# Patient Record
Sex: MALE | Race: WHITE | NOT HISPANIC OR LATINO
[De-identification: names, ages, dates, MRNs, and addresses within clinical notes are randomized per-mention and may not be internally consistent; named-entity substitution may affect disease eponyms.]

---

## 2018-03-26 ENCOUNTER — NON-APPOINTMENT (OUTPATIENT)
Age: 73
End: 2018-03-26

## 2018-03-26 ENCOUNTER — APPOINTMENT (OUTPATIENT)
Dept: HEART AND VASCULAR | Facility: CLINIC | Age: 73
End: 2018-03-26
Payer: COMMERCIAL

## 2018-03-26 VITALS
BODY MASS INDEX: 25.46 KG/M2 | HEART RATE: 70 BPM | RESPIRATION RATE: 14 BRPM | SYSTOLIC BLOOD PRESSURE: 136 MMHG | DIASTOLIC BLOOD PRESSURE: 76 MMHG | WEIGHT: 168 LBS | HEIGHT: 68 IN

## 2018-03-26 DIAGNOSIS — K90.0 CELIAC DISEASE: ICD-10-CM

## 2018-03-26 PROCEDURE — 93000 ELECTROCARDIOGRAM COMPLETE: CPT

## 2018-03-26 PROCEDURE — 99214 OFFICE O/P EST MOD 30 MIN: CPT | Mod: 25

## 2018-03-26 PROCEDURE — 36415 COLL VENOUS BLD VENIPUNCTURE: CPT

## 2018-03-26 RX ORDER — CHLORHEXIDINE GLUCONATE 4 %
1000 LIQUID (ML) TOPICAL
Refills: 0 | Status: ACTIVE | COMMUNITY

## 2018-03-27 LAB
ANION GAP SERPL CALC-SCNC: 15 MMOL/L
BUN SERPL-MCNC: 20 MG/DL
CALCIUM SERPL-MCNC: 8.8 MG/DL
CHLORIDE SERPL-SCNC: 102 MMOL/L
CO2 SERPL-SCNC: 23 MMOL/L
CREAT SERPL-MCNC: 0.98 MG/DL
GLUCOSE SERPL-MCNC: 96 MG/DL
POTASSIUM SERPL-SCNC: 4.9 MMOL/L
SODIUM SERPL-SCNC: 140 MMOL/L

## 2018-04-02 ENCOUNTER — OTHER (OUTPATIENT)
Age: 73
End: 2018-04-02

## 2018-04-02 ENCOUNTER — RX RENEWAL (OUTPATIENT)
Age: 73
End: 2018-04-02

## 2018-04-13 ENCOUNTER — RESULT REVIEW (OUTPATIENT)
Age: 73
End: 2018-04-13

## 2018-05-21 ENCOUNTER — APPOINTMENT (OUTPATIENT)
Dept: HEART AND VASCULAR | Facility: CLINIC | Age: 73
End: 2018-05-21
Payer: COMMERCIAL

## 2018-05-21 VITALS
WEIGHT: 158 LBS | HEART RATE: 80 BPM | RESPIRATION RATE: 14 BRPM | SYSTOLIC BLOOD PRESSURE: 118 MMHG | BODY MASS INDEX: 23.95 KG/M2 | HEIGHT: 68 IN | DIASTOLIC BLOOD PRESSURE: 68 MMHG

## 2018-05-21 PROCEDURE — 99214 OFFICE O/P EST MOD 30 MIN: CPT

## 2018-05-21 RX ORDER — METOPROLOL SUCCINATE 50 MG/1
50 TABLET, EXTENDED RELEASE ORAL
Qty: 2 | Refills: 0 | Status: DISCONTINUED | COMMUNITY
Start: 2018-04-02 | End: 2018-05-21

## 2018-08-14 ENCOUNTER — LABORATORY RESULT (OUTPATIENT)
Age: 73
End: 2018-08-14

## 2018-08-14 ENCOUNTER — APPOINTMENT (OUTPATIENT)
Dept: HEART AND VASCULAR | Facility: CLINIC | Age: 73
End: 2018-08-14
Payer: COMMERCIAL

## 2018-08-14 PROCEDURE — 36415 COLL VENOUS BLD VENIPUNCTURE: CPT

## 2018-08-15 LAB
ALBUMIN SERPL ELPH-MCNC: 4 G/DL
ALP BLD-CCNC: 221 U/L
ALT SERPL-CCNC: 83 U/L
ANION GAP SERPL CALC-SCNC: 12 MMOL/L
AST SERPL-CCNC: 56 U/L
B BURGDOR AB SER-IMP: POSITIVE
B BURGDOR IGG+IGM SER QL IB: NORMAL
B BURGDOR IGG+IGM SER QL: 7.46 INDEX
BASOPHILS # BLD AUTO: 0.04 K/UL
BASOPHILS NFR BLD AUTO: 0.4 %
BILIRUB SERPL-MCNC: 0.3 MG/DL
BUN SERPL-MCNC: 15 MG/DL
CALCIUM SERPL-MCNC: 9.3 MG/DL
CHLORIDE SERPL-SCNC: 107 MMOL/L
CHOLEST SERPL-MCNC: 159 MG/DL
CHOLEST/HDLC SERPL: 7.2 RATIO
CO2 SERPL-SCNC: 22 MMOL/L
CREAT SERPL-MCNC: 0.95 MG/DL
EOSINOPHIL # BLD AUTO: 0.22 K/UL
EOSINOPHIL NFR BLD AUTO: 2.1 %
ERYTHROCYTE [SEDIMENTATION RATE] IN BLOOD BY WESTERGREN METHOD: 38 MM/HR
GLUCOSE SERPL-MCNC: 88 MG/DL
HCT VFR BLD CALC: 46 %
HDLC SERPL-MCNC: 22 MG/DL
HGB BLD-MCNC: 14.2 G/DL
IMM GRANULOCYTES NFR BLD AUTO: 0.2 %
LDLC SERPL CALC-MCNC: 105 MG/DL
LYMPHOCYTES # BLD AUTO: 1.43 K/UL
LYMPHOCYTES NFR BLD AUTO: 13.3 %
MAN DIFF?: NORMAL
MCHC RBC-ENTMCNC: 22.7 PG
MCHC RBC-ENTMCNC: 30.9 GM/DL
MCV RBC AUTO: 73.5 FL
MONOCYTES # BLD AUTO: 0.59 K/UL
MONOCYTES NFR BLD AUTO: 5.5 %
NEUTROPHILS # BLD AUTO: 8.42 K/UL
NEUTROPHILS NFR BLD AUTO: 78.5 %
PLATELET # BLD AUTO: 355 K/UL
POTASSIUM SERPL-SCNC: 5.1 MMOL/L
PROT SERPL-MCNC: 7.2 G/DL
RBC # BLD: 6.26 M/UL
RBC # FLD: 15.5 %
SODIUM SERPL-SCNC: 141 MMOL/L
TRIGL SERPL-MCNC: 158 MG/DL
WBC # FLD AUTO: 10.72 K/UL

## 2018-08-17 LAB
ANAPLASMA PHAGOCYTO IGM COMENT: NORMAL
ANAPLASMA PHAGOCYTO IGM COMMENT: NORMAL
ANAPLASMA PHAGOCYTOPHILIA IGG ANTIBODIES: NORMAL
ANAPLASMA PHAGOCYTOPHILIA IGM ANTIBODIES: NORMAL
B MICROTI AB SER QL: NORMAL
BABESIA ANTIBODIES, IGG: NORMAL
BABESIA ANTIBODIES, IGM: NORMAL
EHRLICIA CHAFFEENIS IGG ANTIBODIES: NORMAL
EHRLICIA CHAFFEENIS IGG COMMENT: NORMAL
EHRLICIA CHAFFEENIS IGG INTERP: NORMAL
EHRLICIA CHAFFEENIS IGM ANTIBODIES: NORMAL

## 2018-08-21 ENCOUNTER — RESULT REVIEW (OUTPATIENT)
Age: 73
End: 2018-08-21

## 2018-08-22 ENCOUNTER — RESULT REVIEW (OUTPATIENT)
Age: 73
End: 2018-08-22

## 2018-08-22 LAB
CK BB SERPL ELPH-CCNC: 0 % (ref 0–?)
CK MB CFR SERPL ELPH: 0 %
CK MM SERPL ELPH-CCNC: 100 %
CREATINE KINASE,TOTAL,SERUM: 142 U/L
MACRO TYPE 1: 0 %
MACRO TYPE 2: 0 %

## 2019-03-11 ENCOUNTER — APPOINTMENT (OUTPATIENT)
Dept: HEART AND VASCULAR | Facility: CLINIC | Age: 74
End: 2019-03-11
Payer: COMMERCIAL

## 2019-03-11 VITALS
RESPIRATION RATE: 12 BRPM | DIASTOLIC BLOOD PRESSURE: 66 MMHG | BODY MASS INDEX: 23.34 KG/M2 | SYSTOLIC BLOOD PRESSURE: 114 MMHG | HEART RATE: 58 BPM | HEIGHT: 68 IN | WEIGHT: 154 LBS

## 2019-03-11 DIAGNOSIS — Z86.19 PERSONAL HISTORY OF OTHER INFECTIOUS AND PARASITIC DISEASES: ICD-10-CM

## 2019-03-11 DIAGNOSIS — Z86.14 PERSONAL HISTORY OF METHICILLIN RESISTANT STAPHYLOCOCCUS AUREUS INFECTION: ICD-10-CM

## 2019-03-11 PROCEDURE — 99214 OFFICE O/P EST MOD 30 MIN: CPT

## 2019-03-11 RX ORDER — FOLIC ACID 1 MG/1
1 TABLET ORAL
Refills: 0 | Status: ACTIVE | COMMUNITY

## 2019-03-11 NOTE — REVIEW OF SYSTEMS
[Earache] : no earache [Discharge From The Ears] : no discharge from the ears [Loss Of Hearing] : hearing loss [Mouth Sores] : no mouth sores [Sore Throat] : no sore throat [Sinus Pressure] : no sinus pressure [Negative] : Heme/Lymph

## 2019-03-11 NOTE — DISCUSSION/SUMMARY
[Cardiomyopathy] : cardiomyopathy [Coronary Artery Disease] : coronary artery disease [Possible Cardiac Ischemia (Intermd Prob)] : possible cardiac ischemia (intermediate probability) [Non-Cardiac] : non-cardiac symptoms [Dietary Modification] : dietary modification [Hyperlipidemia] : hyperlipidemia [Diet Modification] : diet modification [Exercise] : exercise [Hypertension] : hypertension [Exercise Regimen] : an exercise regimen [Sodium Restriction] : sodium restriction [Peripheral Vascular Disease] : peripheral vascular disease [Stable] : stable [None] : none [Exercise Rehab] : exercise rehabilitation [Patient] : the patient [de-identified] : TAA 4.6 cm

## 2019-03-11 NOTE — HISTORY OF PRESENT ILLNESS
[FreeTextEntry1] : James Francois returns for follow up.  He denies cp, sob, pnd, orthopnea, edema, palp, or loc.\par \par He is active and compliant with meds.\par \par He has been treated for MRSA and Lyme.

## 2019-03-11 NOTE — PHYSICAL EXAM
[General Appearance - Well Developed] : well developed [Normal Appearance] : normal appearance [Well Groomed] : well groomed [General Appearance - Well Nourished] : well nourished [No Deformities] : no deformities [General Appearance - In No Acute Distress] : no acute distress [Normal Conjunctiva] : the conjunctiva exhibited no abnormalities [Eyelids - No Xanthelasma] : the eyelids demonstrated no xanthelasmas [Normal Oral Mucosa] : normal oral mucosa [No Oral Pallor] : no oral pallor [No Oral Cyanosis] : no oral cyanosis [Normal Jugular Venous A Waves Present] : normal jugular venous A waves present [Normal Jugular Venous V Waves Present] : normal jugular venous V waves present [No Jugular Venous Doran A Waves] : no jugular venous doran A waves [Respiration, Rhythm And Depth] : normal respiratory rhythm and effort [Exaggerated Use Of Accessory Muscles For Inspiration] : no accessory muscle use [Auscultation Breath Sounds / Voice Sounds] : lungs were clear to auscultation bilaterally [Heart Rate And Rhythm] : heart rate and rhythm were normal [Heart Sounds] : normal S1 and S2 [Abdomen Soft] : soft [Abdomen Tenderness] : non-tender [Abdomen Mass (___ Cm)] : no abdominal mass palpated [Abnormal Walk] : normal gait [Gait - Sufficient For Exercise Testing] : the gait was sufficient for exercise testing [Nail Clubbing] : no clubbing of the fingernails [Cyanosis, Localized] : no localized cyanosis [Petechial Hemorrhages (___cm)] : no petechial hemorrhages [FreeTextEntry1] : L arm in sling [Skin Color & Pigmentation] : normal skin color and pigmentation [] : no rash [No Venous Stasis] : no venous stasis [Skin Lesions] : no skin lesions [No Skin Ulcers] : no skin ulcer [No Xanthoma] : no  xanthoma was observed

## 2019-06-11 ENCOUNTER — OTHER (OUTPATIENT)
Age: 74
End: 2019-06-11

## 2019-06-11 ENCOUNTER — APPOINTMENT (OUTPATIENT)
Dept: HEART AND VASCULAR | Facility: CLINIC | Age: 74
End: 2019-06-11
Payer: COMMERCIAL

## 2019-06-11 PROCEDURE — 36415 COLL VENOUS BLD VENIPUNCTURE: CPT

## 2019-06-11 NOTE — PHYSICAL EXAM
[General Appearance - Well Developed] : well developed [Normal Appearance] : normal appearance [Well Groomed] : well groomed [General Appearance - Well Nourished] : well nourished [No Deformities] : no deformities [Normal Conjunctiva] : the conjunctiva exhibited no abnormalities [General Appearance - In No Acute Distress] : no acute distress [Eyelids - No Xanthelasma] : the eyelids demonstrated no xanthelasmas [Normal Oral Mucosa] : normal oral mucosa [No Oral Pallor] : no oral pallor [No Oral Cyanosis] : no oral cyanosis [Normal Jugular Venous A Waves Present] : normal jugular venous A waves present [No Jugular Venous Doran A Waves] : no jugular venous doran A waves [Normal Jugular Venous V Waves Present] : normal jugular venous V waves present [Respiration, Rhythm And Depth] : normal respiratory rhythm and effort [Exaggerated Use Of Accessory Muscles For Inspiration] : no accessory muscle use [Auscultation Breath Sounds / Voice Sounds] : lungs were clear to auscultation bilaterally [Heart Rate And Rhythm] : heart rate and rhythm were normal [Heart Sounds] : normal S1 and S2 [Abdomen Soft] : soft [Abdomen Tenderness] : non-tender [Abdomen Mass (___ Cm)] : no abdominal mass palpated [Abnormal Walk] : normal gait [Gait - Sufficient For Exercise Testing] : the gait was sufficient for exercise testing [Nail Clubbing] : no clubbing of the fingernails [Petechial Hemorrhages (___cm)] : no petechial hemorrhages [Cyanosis, Localized] : no localized cyanosis [FreeTextEntry1] : L arm in sling [] : no rash [Skin Color & Pigmentation] : normal skin color and pigmentation [No Venous Stasis] : no venous stasis [Skin Lesions] : no skin lesions [No Skin Ulcers] : no skin ulcer [No Xanthoma] : no  xanthoma was observed

## 2019-06-11 NOTE — HISTORY OF PRESENT ILLNESS
[FreeTextEntry1] : James STUBBS [facility] returns for follow up.  \par \par He is active and compliant with meds.\par \par He has been treated for MRSA and Lyme.

## 2019-06-11 NOTE — DISCUSSION/SUMMARY
[Cardiomyopathy] : cardiomyopathy [Coronary Artery Disease] : coronary artery disease [Possible Cardiac Ischemia (Intermd Prob)] : possible cardiac ischemia (intermediate probability) [Non-Cardiac] : non-cardiac symptoms [Dietary Modification] : dietary modification [Hyperlipidemia] : hyperlipidemia [Exercise] : exercise [Diet Modification] : diet modification [Hypertension] : hypertension [Exercise Regimen] : an exercise regimen [Sodium Restriction] : sodium restriction [Peripheral Vascular Disease] : peripheral vascular disease [None] : none [Stable] : stable [Exercise Rehab] : exercise rehabilitation [Patient] : the patient [de-identified] : TAA 4.6 cm

## 2019-06-12 LAB
ANION GAP SERPL CALC-SCNC: 10 MMOL/L
BUN SERPL-MCNC: 18 MG/DL
CALCIUM SERPL-MCNC: 8.7 MG/DL
CHLORIDE SERPL-SCNC: 108 MMOL/L
CO2 SERPL-SCNC: 26 MMOL/L
CREAT SERPL-MCNC: 0.98 MG/DL
GLUCOSE SERPL-MCNC: 98 MG/DL
POTASSIUM SERPL-SCNC: 5.1 MMOL/L
SODIUM SERPL-SCNC: 144 MMOL/L

## 2019-06-17 ENCOUNTER — APPOINTMENT (OUTPATIENT)
Dept: HEART AND VASCULAR | Facility: CLINIC | Age: 74
End: 2019-06-17
Payer: COMMERCIAL

## 2019-06-17 PROCEDURE — 93351 STRESS TTE COMPLETE: CPT

## 2019-06-17 PROCEDURE — 93320 DOPPLER ECHO COMPLETE: CPT

## 2019-06-17 PROCEDURE — 93325 DOPPLER ECHO COLOR FLOW MAPG: CPT

## 2019-06-25 ENCOUNTER — RESULT REVIEW (OUTPATIENT)
Age: 74
End: 2019-06-25

## 2019-10-24 ENCOUNTER — NON-APPOINTMENT (OUTPATIENT)
Age: 74
End: 2019-10-24

## 2019-10-24 ENCOUNTER — APPOINTMENT (OUTPATIENT)
Dept: HEART AND VASCULAR | Facility: CLINIC | Age: 74
End: 2019-10-24
Payer: COMMERCIAL

## 2019-10-24 VITALS
WEIGHT: 156 LBS | HEIGHT: 68 IN | BODY MASS INDEX: 23.64 KG/M2 | DIASTOLIC BLOOD PRESSURE: 76 MMHG | RESPIRATION RATE: 16 BRPM | SYSTOLIC BLOOD PRESSURE: 132 MMHG | HEART RATE: 48 BPM

## 2019-10-24 DIAGNOSIS — I08.0 RHEUMATIC DISORDERS OF BOTH MITRAL AND AORTIC VALVES: ICD-10-CM

## 2019-10-24 PROCEDURE — 93000 ELECTROCARDIOGRAM COMPLETE: CPT

## 2019-10-24 PROCEDURE — 99214 OFFICE O/P EST MOD 30 MIN: CPT | Mod: 25

## 2019-10-24 NOTE — REASON FOR VISIT
[Follow-Up - Clinic] : a clinic follow-up of [Abnormal ECG] : an abnormal ECG [Cardiomyopathy] : cardiomyopathy [Coronary Artery Disease] : coronary artery disease [Hyperlipidemia] : hyperlipidemia [Hypertension] : hypertension [Peripheral Vascular Disease] : peripheral vascular disease

## 2019-10-24 NOTE — REVIEW OF SYSTEMS
[Fever] : no fever [Headache] : no headache [Chills] : no chills [Feeling Fatigued] : feeling fatigued [Earache] : no earache [Discharge From The Ears] : no discharge from the ears [Loss Of Hearing] : hearing loss [Mouth Sores] : no mouth sores [Sore Throat] : no sore throat [Sinus Pressure] : no sinus pressure [Negative] : Heme/Lymph

## 2019-10-24 NOTE — DISCUSSION/SUMMARY
[Bundle Branch Block] : ~T bundle branch block [Cardiomyopathy] : cardiomyopathy [Coronary Artery Disease] : coronary artery disease [Possible Cardiac Ischemia (Intermd Prob)] : possible cardiac ischemia (intermediate probability) [Non-Cardiac] : non-cardiac symptoms [Dietary Modification] : dietary modification [Hyperlipidemia] : hyperlipidemia [Diet Modification] : diet modification [Exercise] : exercise [Hypertension] : hypertension [Exercise Regimen] : an exercise regimen [Sodium Restriction] : sodium restriction [Peripheral Vascular Disease] : peripheral vascular disease [Stable] : stable [None] : none [Exercise Rehab] : exercise rehabilitation [Patient] : the patient [de-identified] : TAA 4.6 cm

## 2019-10-24 NOTE — HISTORY OF PRESENT ILLNESS
[FreeTextEntry1] : James Francois returns for follow up.  He was evaluated at [facility] for flank pain twice in early October 2019.  He was noted to have R sided hydronephrosis by renal ultrasound.  He was treated with pain meds and hydration.  He passed a stone a few days later.  \par \par He denies cp, sob, pnd, orthopnea, edema, palp, or loc.\par \par It was noted that he had ectopy and bradycardia.  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  Consider EP eligio.\par

## 2019-11-13 ENCOUNTER — RX CHANGE (OUTPATIENT)
Age: 74
End: 2019-11-13

## 2019-12-06 ENCOUNTER — APPOINTMENT (OUTPATIENT)
Dept: HEART AND VASCULAR | Facility: CLINIC | Age: 74
End: 2019-12-06
Payer: COMMERCIAL

## 2019-12-06 ENCOUNTER — NON-APPOINTMENT (OUTPATIENT)
Age: 74
End: 2019-12-06

## 2019-12-06 VITALS
HEART RATE: 56 BPM | DIASTOLIC BLOOD PRESSURE: 85 MMHG | RESPIRATION RATE: 16 BRPM | BODY MASS INDEX: 23.95 KG/M2 | SYSTOLIC BLOOD PRESSURE: 142 MMHG | WEIGHT: 158 LBS | HEIGHT: 68 IN

## 2019-12-06 PROCEDURE — 99205 OFFICE O/P NEW HI 60 MIN: CPT

## 2019-12-06 PROCEDURE — 93000 ELECTROCARDIOGRAM COMPLETE: CPT

## 2019-12-06 NOTE — REASON FOR VISIT
[Initial Evaluation] : an initial evaluation of [Abnormal ECG] : an abnormal ECG [FreeTextEntry1] : James Francois is a 74 y.o. M with pmhx significant for IVCD (RBBB+LAFB), nephrolithiasis, thoracic aortic aneurysm, and CAD s/p stent who presents for an initial evaluation.\par \par The patient had a recent hospitalization for nephrolithiasis where they incidentally found EKG abnormalities. The patient was given a holter monitor which showed ventricular bigeminy, pSVT, NSVT (few beats). No pauses or significant bradycardia. The patient denies syncope, lightheadedness, sob, [place], c/p and LE edema.   \par \par \par \par

## 2019-12-06 NOTE — PHYSICAL EXAM
[General Appearance - Well Developed] : well developed [Normal Appearance] : normal appearance [Well Groomed] : well groomed [General Appearance - Well Nourished] : well nourished [No Deformities] : no deformities [General Appearance - In No Acute Distress] : no acute distress [Normal Conjunctiva] : the conjunctiva exhibited no abnormalities [Eyelids - No Xanthelasma] : the eyelids demonstrated no xanthelasmas [No Oral Pallor] : no oral pallor [Normal Oral Mucosa] : normal oral mucosa [No Oral Cyanosis] : no oral cyanosis [Normal Jugular Venous A Waves Present] : normal jugular venous A waves present [Normal Jugular Venous V Waves Present] : normal jugular venous V waves present [No Jugular Venous Doran A Waves] : no jugular venous doran A waves [Heart Rate And Rhythm] : heart rate and rhythm were normal [Heart Sounds] : normal S1 and S2 [Bradycardic ___] : the heart rate was bradycardic at [unfilled] bpm [Regular] : the rhythm was regular [Respiration, Rhythm And Depth] : normal respiratory rhythm and effort [Exaggerated Use Of Accessory Muscles For Inspiration] : no accessory muscle use [Auscultation Breath Sounds / Voice Sounds] : lungs were clear to auscultation bilaterally [Abdomen Soft] : soft [Abdomen Tenderness] : non-tender [Abdomen Mass (___ Cm)] : no abdominal mass palpated [Abnormal Walk] : normal gait [Gait - Sufficient For Exercise Testing] : the gait was sufficient for exercise testing [Nail Clubbing] : no clubbing of the fingernails [Cyanosis, Localized] : no localized cyanosis [Petechial Hemorrhages (___cm)] : no petechial hemorrhages [Skin Color & Pigmentation] : normal skin color and pigmentation [] : no rash [No Venous Stasis] : no venous stasis [No Skin Ulcers] : no skin ulcer [Skin Lesions] : no skin lesions [No Xanthoma] : no  xanthoma was observed [Oriented To Time, Place, And Person] : oriented to person, place, and time [Affect] : the affect was normal [Mood] : the mood was normal [No Anxiety] : not feeling anxious

## 2020-08-13 ENCOUNTER — APPOINTMENT (OUTPATIENT)
Dept: HEART AND VASCULAR | Facility: CLINIC | Age: 75
End: 2020-08-13
Payer: COMMERCIAL

## 2020-08-13 ENCOUNTER — NON-APPOINTMENT (OUTPATIENT)
Age: 75
End: 2020-08-13

## 2020-08-13 VITALS
HEART RATE: 68 BPM | BODY MASS INDEX: 24.55 KG/M2 | TEMPERATURE: 97.8 F | RESPIRATION RATE: 16 BRPM | SYSTOLIC BLOOD PRESSURE: 125 MMHG | DIASTOLIC BLOOD PRESSURE: 84 MMHG | WEIGHT: 162 LBS | OXYGEN SATURATION: 97 % | HEIGHT: 68 IN

## 2020-08-13 PROCEDURE — 99215 OFFICE O/P EST HI 40 MIN: CPT

## 2020-08-13 PROCEDURE — 93000 ELECTROCARDIOGRAM COMPLETE: CPT

## 2020-08-13 NOTE — HISTORY OF PRESENT ILLNESS
[FreeTextEntry1] : James Francois returns for follow up.  \par \par He developed chest pressure, diaphoresis, nausea, and lightheadedness while getting dental work yesterday.  He was unsteady for some time.  He went home and rested for the remainder of the day.  \par \par Today, he denies cp, sob, pnd, orthopnea, edema, palp, or loc.\par \par He is active (plays golf frequently) and remains compliant with meds.\par \par ECG today reveals NSR, RBBB, LAFB.\par \par Reviewed clinical hx in detail.\par \par Recommendations:\par 1. blood work\par 2. EXSE\par 3. CTA chest/coronary\par

## 2020-08-13 NOTE — PHYSICAL EXAM
[Well Groomed] : well groomed [General Appearance - Well Developed] : well developed [Normal Appearance] : normal appearance [No Deformities] : no deformities [General Appearance - In No Acute Distress] : no acute distress [General Appearance - Well Nourished] : well nourished [Eyelids - No Xanthelasma] : the eyelids demonstrated no xanthelasmas [Normal Conjunctiva] : the conjunctiva exhibited no abnormalities [Normal Oral Mucosa] : normal oral mucosa [No Oral Pallor] : no oral pallor [Normal Jugular Venous A Waves Present] : normal jugular venous A waves present [No Oral Cyanosis] : no oral cyanosis [No Jugular Venous Doran A Waves] : no jugular venous doran A waves [Normal Jugular Venous V Waves Present] : normal jugular venous V waves present [Exaggerated Use Of Accessory Muscles For Inspiration] : no accessory muscle use [Respiration, Rhythm And Depth] : normal respiratory rhythm and effort [Auscultation Breath Sounds / Voice Sounds] : lungs were clear to auscultation bilaterally [Heart Rate And Rhythm] : heart rate and rhythm were normal [Heart Sounds] : normal S1 and S2 [Abdomen Tenderness] : non-tender [Abdomen Soft] : soft [Abnormal Walk] : normal gait [Abdomen Mass (___ Cm)] : no abdominal mass palpated [Nail Clubbing] : no clubbing of the fingernails [Gait - Sufficient For Exercise Testing] : the gait was sufficient for exercise testing [Cyanosis, Localized] : no localized cyanosis [Petechial Hemorrhages (___cm)] : no petechial hemorrhages [] : no rash [Skin Color & Pigmentation] : normal skin color and pigmentation [FreeTextEntry1] : L arm in sling [Skin Lesions] : no skin lesions [No Skin Ulcers] : no skin ulcer [No Venous Stasis] : no venous stasis [No Xanthoma] : no  xanthoma was observed

## 2020-08-13 NOTE — REVIEW OF SYSTEMS
[Fever] : no fever [Headache] : no headache [Chills] : no chills [Feeling Fatigued] : not feeling fatigued [Earache] : no earache [Discharge From The Ears] : no discharge from the ears [Loss Of Hearing] : hearing loss [Mouth Sores] : no mouth sores [Sore Throat] : no sore throat [Sinus Pressure] : no sinus pressure [Negative] : Heme/Lymph

## 2020-08-13 NOTE — DISCUSSION/SUMMARY
[Bundle Branch Block] : ~T bundle branch block [Cardiomyopathy] : cardiomyopathy [Possible Cardiac Ischemia (Intermd Prob)] : possible cardiac ischemia (intermediate probability) [Anginal Equivalent] : anginal equivalent [Non-Cardiac] : non-cardiac symptoms [Deteriorating] : deteriorating [Dietary Modification] : dietary modification [Hyperlipidemia] : hyperlipidemia [Diet Modification] : diet modification [Exercise] : exercise [Hypertension] : hypertension [Exercise Regimen] : an exercise regimen [Sodium Restriction] : sodium restriction [Stable] : stable [Peripheral Vascular Disease] : peripheral vascular disease [None] : none [Exercise Rehab] : exercise rehabilitation [de-identified] : TAA 4.6 cm [Patient] : the patient

## 2020-08-14 LAB
ALBUMIN SERPL ELPH-MCNC: 4.4 G/DL
ALP BLD-CCNC: 106 U/L
ALT SERPL-CCNC: 35 U/L
ANION GAP SERPL CALC-SCNC: 12 MMOL/L
AST SERPL-CCNC: 30 U/L
BASOPHILS # BLD AUTO: 0.03 K/UL
BASOPHILS NFR BLD AUTO: 0.4 %
BILIRUB SERPL-MCNC: 0.5 MG/DL
BUN SERPL-MCNC: 23 MG/DL
CALCIUM SERPL-MCNC: 8.9 MG/DL
CHLORIDE SERPL-SCNC: 107 MMOL/L
CHOLEST SERPL-MCNC: 194 MG/DL
CHOLEST/HDLC SERPL: 4.2 RATIO
CK SERPL-CCNC: 178 U/L
CO2 SERPL-SCNC: 23 MMOL/L
CREAT SERPL-MCNC: 1.05 MG/DL
EOSINOPHIL # BLD AUTO: 0.36 K/UL
EOSINOPHIL NFR BLD AUTO: 4.5 %
GLUCOSE SERPL-MCNC: 107 MG/DL
HCT VFR BLD CALC: 48.1 %
HDLC SERPL-MCNC: 46 MG/DL
HGB BLD-MCNC: 14.6 G/DL
IMM GRANULOCYTES NFR BLD AUTO: 0.3 %
LDLC SERPL CALC-MCNC: 119 MG/DL
LYMPHOCYTES # BLD AUTO: 1.74 K/UL
LYMPHOCYTES NFR BLD AUTO: 21.8 %
MAN DIFF?: NORMAL
MCHC RBC-ENTMCNC: 24.3 PG
MCHC RBC-ENTMCNC: 30.4 GM/DL
MCV RBC AUTO: 79.9 FL
MONOCYTES # BLD AUTO: 0.47 K/UL
MONOCYTES NFR BLD AUTO: 5.9 %
NEUTROPHILS # BLD AUTO: 5.38 K/UL
NEUTROPHILS NFR BLD AUTO: 67.1 %
PLATELET # BLD AUTO: 246 K/UL
POTASSIUM SERPL-SCNC: 4.6 MMOL/L
PROT SERPL-MCNC: 6.5 G/DL
RBC # BLD: 6.02 M/UL
RBC # FLD: 16.2 %
SODIUM SERPL-SCNC: 142 MMOL/L
TRIGL SERPL-MCNC: 146 MG/DL
TROPONIN I SERPL-MCNC: 0.01 NG/ML
WBC # FLD AUTO: 8 K/UL

## 2020-08-17 ENCOUNTER — APPOINTMENT (OUTPATIENT)
Dept: HEART AND VASCULAR | Facility: CLINIC | Age: 75
End: 2020-08-17
Payer: COMMERCIAL

## 2020-08-17 VITALS
BODY MASS INDEX: 23.49 KG/M2 | TEMPERATURE: 98.2 F | DIASTOLIC BLOOD PRESSURE: 76 MMHG | HEIGHT: 68 IN | WEIGHT: 155 LBS | HEART RATE: 53 BPM | SYSTOLIC BLOOD PRESSURE: 108 MMHG | OXYGEN SATURATION: 96 %

## 2020-08-17 PROCEDURE — 93351 STRESS TTE COMPLETE: CPT

## 2020-08-17 PROCEDURE — 93320 DOPPLER ECHO COMPLETE: CPT

## 2020-08-17 PROCEDURE — 93325 DOPPLER ECHO COLOR FLOW MAPG: CPT

## 2020-08-20 ENCOUNTER — APPOINTMENT (OUTPATIENT)
Dept: HEART AND VASCULAR | Facility: CLINIC | Age: 75
End: 2020-08-20
Payer: COMMERCIAL

## 2020-08-20 VITALS
SYSTOLIC BLOOD PRESSURE: 124 MMHG | TEMPERATURE: 98.5 F | HEART RATE: 54 BPM | OXYGEN SATURATION: 97 % | DIASTOLIC BLOOD PRESSURE: 64 MMHG | HEIGHT: 68 IN | WEIGHT: 155 LBS | RESPIRATION RATE: 14 BRPM | BODY MASS INDEX: 23.49 KG/M2

## 2020-08-20 PROCEDURE — 99215 OFFICE O/P EST HI 40 MIN: CPT

## 2020-08-20 NOTE — REVIEW OF SYSTEMS
[Fever] : no fever [Headache] : no headache [Earache] : no earache [Chills] : no chills [Feeling Fatigued] : not feeling fatigued [Discharge From The Ears] : no discharge from the ears [Sore Throat] : no sore throat [Loss Of Hearing] : hearing loss [Mouth Sores] : no mouth sores [Sinus Pressure] : no sinus pressure [Negative] : Heme/Lymph

## 2020-08-20 NOTE — REASON FOR VISIT
[Follow-Up - Clinic] : a clinic follow-up of [Abnormal ECG] : an abnormal ECG [Cardiomyopathy] : cardiomyopathy [Coronary Artery Disease] : coronary artery disease [Hypertension] : hypertension [Hyperlipidemia] : hyperlipidemia [FreeTextEntry1] : AI/TR [Peripheral Vascular Disease] : peripheral vascular disease

## 2020-08-20 NOTE — HISTORY OF PRESENT ILLNESS
[FreeTextEntry1] : James STUBBS [facility] returns for follow up.  \par Clinical hx from 8/13/2020 visit:\par He developed chest pressure, diaphoresis, nausea, and lightheadedness while getting dental work yesterday.  He was unsteady for some time.  He went home and rested for the remainder of the day.  \par \par Today, he denies cp, sob, pnd, orthopnea, edema, palp, or loc.\par \par He is active (plays golf frequently) and remains compliant with meds.\par \par EXSE 8/2020: nl lv sys fxn; indeterminant caba fxn; Ao root (4.6 cm); mild to mod AI; mild TR; 11:15 min Blayne; no ischemia; high grade ventricular ectopy in recovery\par \par Reviewed clinical hx and results in detail.\par \par Recommendations:\par 1. CTA chest/coronary\par 2. consider ZIO\par 3. collect records for patient\par

## 2020-08-20 NOTE — PHYSICAL EXAM
[General Appearance - Well Developed] : well developed [Well Groomed] : well groomed [Normal Appearance] : normal appearance [General Appearance - In No Acute Distress] : no acute distress [General Appearance - Well Nourished] : well nourished [No Deformities] : no deformities [Normal Conjunctiva] : the conjunctiva exhibited no abnormalities [Normal Oral Mucosa] : normal oral mucosa [Eyelids - No Xanthelasma] : the eyelids demonstrated no xanthelasmas [No Oral Pallor] : no oral pallor [No Oral Cyanosis] : no oral cyanosis [Normal Jugular Venous A Waves Present] : normal jugular venous A waves present [Normal Jugular Venous V Waves Present] : normal jugular venous V waves present [No Jugular Venous Doran A Waves] : no jugular venous doran A waves [Exaggerated Use Of Accessory Muscles For Inspiration] : no accessory muscle use [Auscultation Breath Sounds / Voice Sounds] : lungs were clear to auscultation bilaterally [Respiration, Rhythm And Depth] : normal respiratory rhythm and effort [Heart Rate And Rhythm] : heart rate and rhythm were normal [Heart Sounds] : normal S1 and S2 [Abdomen Mass (___ Cm)] : no abdominal mass palpated [Abdomen Soft] : soft [Abdomen Tenderness] : non-tender [Gait - Sufficient For Exercise Testing] : the gait was sufficient for exercise testing [Abnormal Walk] : normal gait [Nail Clubbing] : no clubbing of the fingernails [Petechial Hemorrhages (___cm)] : no petechial hemorrhages [Cyanosis, Localized] : no localized cyanosis [No Venous Stasis] : no venous stasis [FreeTextEntry1] : L arm in sling [] : no rash [Skin Color & Pigmentation] : normal skin color and pigmentation [No Skin Ulcers] : no skin ulcer [Skin Lesions] : no skin lesions [No Xanthoma] : no  xanthoma was observed

## 2020-08-22 ENCOUNTER — NON-APPOINTMENT (OUTPATIENT)
Age: 75
End: 2020-08-22

## 2020-09-09 ENCOUNTER — RESULT REVIEW (OUTPATIENT)
Age: 75
End: 2020-09-09

## 2020-09-14 RX ORDER — METOPROLOL SUCCINATE 25 MG/1
25 TABLET, EXTENDED RELEASE ORAL
Qty: 2 | Refills: 0 | Status: DISCONTINUED | COMMUNITY
Start: 2020-08-21 | End: 2020-09-14

## 2020-09-23 ENCOUNTER — APPOINTMENT (OUTPATIENT)
Dept: HEART AND VASCULAR | Facility: CLINIC | Age: 75
End: 2020-09-23
Payer: COMMERCIAL

## 2020-09-23 PROCEDURE — 36415 COLL VENOUS BLD VENIPUNCTURE: CPT

## 2020-09-24 LAB
ALBUMIN SERPL ELPH-MCNC: 4.2 G/DL
ALP BLD-CCNC: 99 U/L
ALT SERPL-CCNC: 39 U/L
ANION GAP SERPL CALC-SCNC: 12 MMOL/L
APPEARANCE: CLEAR
AST SERPL-CCNC: 43 U/L
BACTERIA: NEGATIVE
BASOPHILS # BLD AUTO: 0.02 K/UL
BASOPHILS NFR BLD AUTO: 0.3 %
BILIRUB SERPL-MCNC: 0.5 MG/DL
BILIRUBIN URINE: NEGATIVE
BLOOD URINE: NEGATIVE
BUN SERPL-MCNC: 17 MG/DL
CALCIUM SERPL-MCNC: 8.7 MG/DL
CHLORIDE SERPL-SCNC: 107 MMOL/L
CHOLEST SERPL-MCNC: 178 MG/DL
CHOLEST/HDLC SERPL: 4.4 RATIO
CK SERPL-CCNC: 178 U/L
CO2 SERPL-SCNC: 24 MMOL/L
COLOR: YELLOW
CREAT SERPL-MCNC: 0.99 MG/DL
EOSINOPHIL # BLD AUTO: 0.16 K/UL
EOSINOPHIL NFR BLD AUTO: 2.1 %
GLUCOSE QUALITATIVE U: NEGATIVE
GLUCOSE SERPL-MCNC: 98 MG/DL
HCT VFR BLD CALC: 47.1 %
HDLC SERPL-MCNC: 40 MG/DL
HGB BLD-MCNC: 13.9 G/DL
HYALINE CASTS: 0 /LPF
IMM GRANULOCYTES NFR BLD AUTO: 0.3 %
INR PPP: 0.99 RATIO
KETONES URINE: NEGATIVE
LDLC SERPL CALC-MCNC: 114 MG/DL
LEUKOCYTE ESTERASE URINE: NEGATIVE
LYMPHOCYTES # BLD AUTO: 1.68 K/UL
LYMPHOCYTES NFR BLD AUTO: 22.1 %
MAN DIFF?: NORMAL
MCHC RBC-ENTMCNC: 23.4 PG
MCHC RBC-ENTMCNC: 29.5 GM/DL
MCV RBC AUTO: 79.3 FL
MICROSCOPIC-UA: NORMAL
MONOCYTES # BLD AUTO: 0.42 K/UL
MONOCYTES NFR BLD AUTO: 5.5 %
NEUTROPHILS # BLD AUTO: 5.29 K/UL
NEUTROPHILS NFR BLD AUTO: 69.7 %
NITRITE URINE: NEGATIVE
PH URINE: 5.5
PLATELET # BLD AUTO: 249 K/UL
POTASSIUM SERPL-SCNC: 4.5 MMOL/L
PROT SERPL-MCNC: 6.4 G/DL
PROTEIN URINE: NORMAL
PT BLD: 11.8 SEC
RBC # BLD: 5.94 M/UL
RBC # FLD: 15.5 %
RED BLOOD CELLS URINE: 4 /HPF
SODIUM SERPL-SCNC: 144 MMOL/L
SPECIFIC GRAVITY URINE: 1.03
SQUAMOUS EPITHELIAL CELLS: 1 /HPF
TRIGL SERPL-MCNC: 123 MG/DL
UROBILINOGEN URINE: ABNORMAL
WBC # FLD AUTO: 7.59 K/UL
WHITE BLOOD CELLS URINE: 1 /HPF

## 2020-09-28 VITALS
WEIGHT: 160.06 LBS | TEMPERATURE: 99 F | HEART RATE: 53 BPM | RESPIRATION RATE: 16 BRPM | OXYGEN SATURATION: 96 % | SYSTOLIC BLOOD PRESSURE: 148 MMHG | DIASTOLIC BLOOD PRESSURE: 91 MMHG | HEIGHT: 69 IN

## 2020-09-28 RX ORDER — CHLORHEXIDINE GLUCONATE 213 G/1000ML
1 SOLUTION TOPICAL ONCE
Refills: 0 | Status: DISCONTINUED | OUTPATIENT
Start: 2020-10-01 | End: 2020-10-01

## 2020-09-28 NOTE — H&P ADULT - HEIGHT IN INCHES
"                  MRN:[id number]                      After Visit Summary   4/19/2017    Jean Paul Siegel    MRN: [id number]           Thank you!     Thank you for choosing [facility] for your care. Our goal is always to provide you with excellent care. Hearing back from our patients is one way we can continue to improve our services. Please take a few minutes to complete the written survey that you may receive in the mail after you visit with us. Thank you!        Patient Information     Date Of Birth          1984        About your hospital stay     You were admitted on:  April 19, 2017 You last received care in the:  [facility], Endoscopy    You were discharged on:  April 19, 2017       Who to Call     For medical emergencies, please call 911.  For non-urgent questions about your medical care, please call your primary care provider or clinic, [phone]  For questions related to your surgery, please call your surgery clinic        Attending Provider     Provider Specialty    Jacob Allen MD Gastroenterology       Primary Care Provider Office Phone # Fax #    Trent KATIE Soria [phone]       [address] [address]        Pending Results     No orders found from 4/17/2017 to 4/20/2017.            Admission Information     Date & Time Provider Department Dept. Phone    4/19/2017 Jacob Allen MD [facility], [facility], Endoscopy [phone]      Your Vitals Were     Blood Pressure Respirations Height Weight Pulse Oximetry BMI (Body Mass Index)    138/89 14 1.829 m (6') 77.1 kg (170 lb) 99% 23.06 kg/m2      [employer] Information     [employer] lets you send messages to your doctor, view your test results, renew your prescriptions, schedule appointments and more. To sign up, go to [email or web address] . Click on \"Log in\" on the left side of the screen, which will take you to the Welcome page. Then click on \"Sign up Now\" on the right side of " the page.     You will be asked to enter the access code listed below, as well as some personal information. Please follow the directions to create your username and password.     Your access code is: [email or web address]  Expires: 2017  6:30 AM     Your access code will  in 90 days. If you need help or a new code, please call your [facility] clinic or [phone].        Care EveryWhere ID     This is your Care EveryWhere ID. This could be used by other organizations to access your [facility] medical records  [id number]           Review of your medicines      UNREVIEWED medicines. Ask your doctor about these medicines        Dose / Directions    * adalimumab 40 MG/0.8ML pen kit   Commonly known as:  HUMIRA PEN-CROHNS STARTER   Used for:  Crohn's disease of large intestine with other complication (H)        Day 1: 160mg (4-40 mg) Day 15: take 80 mg (2-40 mg) Starting on day 29 and every other week take 40 mg   Quantity:  6 each   Refills:  0       * adalimumab 40 MG/0.8ML pen kit   Commonly known as:  HUMIRA   Used for:  Crohn's disease of large intestine with other complication (H)        Dose:  40 mg   Inject 0.8 mLs (40 mg) Subcutaneous every 7 days   Quantity:  3.2 mL   Refills:  11       budesonide 3 MG 24 hr capsule   Commonly known as:  ENTOCORT EC   Used for:  Crohn's disease of large intestine without complication (H)        Dose:  9 mg   Take 3 capsules (9 mg) by mouth daily   Quantity:  90 capsule   Refills:  1       calcium carbonate 500 MG chewable tablet   Commonly known as:  TUMS        Dose:  1 chew tab   Take 1 chew tab by mouth daily as needed   Refills:  0       doxycycline 100 MG tablet   Commonly known as:  VIBRA-TABS   Used for:  Cellulitis and abscess of leg, except foot        Dose:  100 mg   Take 1 tablet (100 mg) by mouth 2 times daily   Quantity:  20 tablet   Refills:  0       escitalopram 20 MG tablet   Commonly known as:  LEXAPRO   Used for:  Dysthymia        Take 1/2 tablet (10 mg) for  2 weeks, then increase to 1 tablet orally daily   Quantity:  30 tablet   Refills:  3       * insulin aspart 100 UNIT/ML injection   Commonly known as:  NovoLOG FLEXPEN   Used for:  Type 1 diabetes mellitus with hyperglycemia (H)        Novolog Flexpen Give before meals and before bed: For Pre-Meal Glucose: 140-189 give 1 unit  190-239 give 2 units  240-289 give 3 units  290-339 give 4 units  = or >340 give 5 units   For Bedtime Glucose 200 - 239 give 1 units  240 - 289 give 1.5 units 290 - 339 give 2 units = or >340 give 2.5 units   Quantity:  30 mL   Refills:  0       * NovoLOG FLEXPEN 100 UNIT/ML injection   Used for:  Type 1 diabetes mellitus with hyperglycemia (H)   Generic drug:  insulin aspart        INJECT 3 UNITS PER CARB UNDER THE SKIN. MAX DAILY DOSE OF 90 UNITS   Quantity:  30 mL   Refills:  1       lisinopril 10 MG tablet   Commonly known as:  PRINIVIL/ZESTRIL   Used for:  Essential hypertension, benign        TAKE 2 TABLETS BY MOUTH DAILY   Quantity:  180 tablet   Refills:  3       mycophenolate 250 MG capsule   Commonly known as:  CELLCEPT - GENERIC EQUIVALENT   Used for:  Autoimmune hepatitis (H), Diarrhea, Pruritus, Fibrosis of liver (H)        TAKE 4 CAPSULES BY MOUTH TWICE DAILY   Quantity:  240 capsule   Refills:  0       traZODone 50 MG tablet   Commonly known as:  DESYREL   Used for:  Insomnia        Dose:   mg   Take 1-2 tablets ( mg) by mouth nightly as needed for sleep   Quantity:  90 tablet   Refills:  0       valGANciclovir 450 MG tablet   Commonly known as:  VALCYTE   Used for:  CMV (cytomegalovirus infection) (H)        TAKE 2 TABLETS BY MOUTH TWICE DAILY   Quantity:  90 tablet   Refills:  0       vitamin D3 71483 UNITS capsule   Commonly known as:  CHOLECALCIFEROL   Used for:  Vitamin D deficiency        Dose:  61038 Units   Take 1 capsule (50,000 Units) by mouth twice a week   Quantity:  24 capsule   Refills:  3       * Notice:  This list has 4 medication(s) that are the  same as other medications prescribed for you. Read the directions carefully, and ask your doctor or other care provider to review them with you.      CONTINUE these medicines which have NOT CHANGED        Dose / Directions    blood glucose monitoring lancets   Used for:  Type 1 diabetes mellitus with hyperglycemia (H)        Use to test blood sugar 4 times daily or as directed.   Quantity:  1 Box   Refills:  11       blood glucose monitoring test strip   Commonly known as:  no brand specified   Used for:  Type 1 diabetes mellitus with hyperglycemia (H)        Use to test blood sugars 4 times daily or as directed. Dispense glucometer compatible supplies. [employer] giovanna.   Quantity:  100 each   Refills:  11       insulin pen needle 32G X 4 MM   Commonly known as:  BD GIOVANNA U/F   Used for:  Type 1 diabetes mellitus with hyperglycemia (H)        Use 4 daily or as directed.   Quantity:  100 each   Refills:  11                Protect others around you: Learn how to safely use, store and throw away your medicines at www.disposemymeds.org.             Medication List: This is a list of all your medications and when to take them. Check marks below indicate your daily home schedule. Keep this list as a reference.      Medications           Morning Afternoon Evening Bedtime As Needed    * adalimumab 40 MG/0.8ML pen kit   Commonly known as:  HUMIRA PEN-CROHNS STARTER   Day 1: 160mg (4-40 mg) Day 15: take 80 mg (2-40 mg) Starting on day 29 and every other week take 40 mg                                * adalimumab 40 MG/0.8ML pen kit   Commonly known as:  HUMIRA   Inject 0.8 mLs (40 mg) Subcutaneous every 7 days                                blood glucose monitoring lancets   Use to test blood sugar 4 times daily or as directed.                                blood glucose monitoring test strip   Commonly known as:  no brand specified   Use to test blood sugars 4 times daily or as directed. Dispense glucometer compatible  supplies. [employer] giovanna.                                budesonide 3 MG 24 hr capsule   Commonly known as:  ENTOCORT EC   Take 3 capsules (9 mg) by mouth daily                                calcium carbonate 500 MG chewable tablet   Commonly known as:  TUMS   Take 1 chew tab by mouth daily as needed                                doxycycline 100 MG tablet   Commonly known as:  VIBRA-TABS   Take 1 tablet (100 mg) by mouth 2 times daily                                escitalopram 20 MG tablet   Commonly known as:  LEXAPRO   Take 1/2 tablet (10 mg) for 2 weeks, then increase to 1 tablet orally daily                                * insulin aspart 100 UNIT/ML injection   Commonly known as:  NovoLOG FLEXPEN   Novolog Flexpen Give before meals and before bed: For Pre-Meal Glucose: 140-189 give 1 unit  190-239 give 2 units  240-289 give 3 units  290-339 give 4 units  = or >340 give 5 units   For Bedtime Glucose 200 - 239 give 1 units  240 - 289 give 1.5 units 290 - 339 give 2 units = or >340 give 2.5 units                                * NovoLOG FLEXPEN 100 UNIT/ML injection   INJECT 3 UNITS PER CARB UNDER THE SKIN. MAX DAILY DOSE OF 90 UNITS   Generic drug:  insulin aspart                                insulin pen needle 32G X 4 MM   Commonly known as:  BD GIOVANNA U/F   Use 4 daily or as directed.                                lisinopril 10 MG tablet   Commonly known as:  PRINIVIL/ZESTRIL   TAKE 2 TABLETS BY MOUTH DAILY                                mycophenolate 250 MG capsule   Commonly known as:  CELLCEPT - GENERIC EQUIVALENT   TAKE 4 CAPSULES BY MOUTH TWICE DAILY                                traZODone 50 MG tablet   Commonly known as:  DESYREL   Take 1-2 tablets ( mg) by mouth nightly as needed for sleep                                valGANciclovir 450 MG tablet   Commonly known as:  VALCYTE   TAKE 2 TABLETS BY MOUTH TWICE DAILY                                vitamin D3 38557 UNITS capsule   Commonly known  as:  CHOLECALCIFEROL   Take 1 capsule (50,000 Units) by mouth twice a week                                * Notice:  This list has 4 medication(s) that are the same as other medications prescribed for you. Read the directions carefully, and ask your doctor or other care provider to review them with you.       9

## 2020-09-28 NOTE — H&P ADULT - ASSESSMENT
Patient is a 76yo Male, former smoker, with FHx of CHF and PMHx of HTN, HLD, CAD s/p PCI (CONNIE RPDA 3/2010), known RBBB with LAFB, Cardiomyopathy (normal EF per stress) and Thoracic Aortic Aneurysm who presents for cardiac catheterization secondary to CCS III anginal symptoms in the setting of an abnormal CCTA    ASA III Mallampati III  Precath consented   Started IVF NS @ 75cc/h   Loaded with Plavix 600mg POx1 and given daily dose of ASA 81mg POx1       Risks & benefits of procedure and alternative therapy have been explained to the patient including but not limited to: allergic reaction, bleeding w/possible need for blood transfusion, infection, renal and vascular compromise, limb damage, arrhythmia, stroke, vessel dissection/perforation, Myocardial infarction, emergent CABG. Informed consent obtained and in chart.

## 2020-09-28 NOTE — H&P ADULT - HISTORY OF PRESENT ILLNESS
76yo Male with PMHx of HTN, HLD, known RBBB with LAFB and Thoracic Aortic Aneurysm 74yo Male with PMHx of HTN, HLD, CAD s/p PCI (CONNIE RPDA 3/2010), known RBBB with LAFB, Cardiomyopathy (___), Thoracic Aortic Aneurysm and PVD who presented to his cardiologist Dr. Paolo Alcala c/o _________. He denies any ___ CP, palpitations, dizziness, syncope, diaphoresis, fatigue, LE edema, SOB, LYNNE, orthopnea, PND, N/V/D, abd pain, cough, congestion, fever, chills or recent sick contact. He underwent a CCTA (9/9/20) revealing mod stenosis of mRCA, moderate stenosis proximal to patent RPDA stent, mild disease of pLAD, remaining vessels non obstructive. The pt also had a Stress Echo (    In light of pts risk factors, CCS class __ anginal symptoms and abnormal CCTA, pt now presents to [facility] for recommended cardiac catheterization with possible intervention if clinically indicated. 76yo Male with PMHx of HTN, HLD, CAD s/p PCI (CONNIE RPDA 3/2010), known RBBB with LAFB, Cardiomyopathy (___), Thoracic Aortic Aneurysm and PVD who presented to his cardiologist Dr. Paolo Alcala c/o _________. He denies any ___ CP, palpitations, dizziness, syncope, diaphoresis, fatigue, LE edema, SOB, LYNNE, orthopnea, PND, N/V/D, abd pain, cough, congestion, fever, chills or recent sick contact. He underwent a CCTA (9/9/20) revealing mod stenosis of mRCA, moderate stenosis proximal to patent RPDA stent, mild disease of pLAD, remaining vessels non obstructive.    In light of pts risk factors, CCS class __ anginal symptoms and abnormal CCTA, pt now presents to [facility] for recommended cardiac catheterization with possible intervention if clinically indicated. COVID:     (9/29 @ [facility])  Pharmacy: [facility] [place]  Escort:       74yo Male, former smoker, with FHx of CHF and PMHx of HTN, HLD, CAD s/p PCI (CONNIE RPDA 3/2010), known RBBB with LAFB, Cardiomyopathy (normal EF per stress) and Thoracic Aortic Aneurysm who presented to his cardiologist Dr. Paolo Alcala c/o CP while at the dentist earlier in the month. Pt states that he was seeing his dentist for a check up after a recent root canal and felt left sided pressure that lasted 30 seconds and resolved on its own. He had associated dizziness and diaphoresis and denies any medications or interventions done while at the dentist at that time. He denies any palpitations, syncope, fatigue, LE edema, SOB at rest, LYNNE, orthopnea, PND, N/V/D, abd pain, cough, congestion, fever, chills or recent sick contact. He underwent a CCTA (9/9/20) revealing mod stenosis of mRCA, moderate stenosis proximal to patent RPDA stent, mild disease of pLAD, remaining vessels non obstructive. Pt also had a NST 8/17/20 revealing normal stress echo with normal EF.    In light of pts risk factors, CCS class III anginal symptoms and abnormal CCTA, pt now presents to [facility] for recommended cardiac catheterization with possible intervention if clinically indicated. COVID:    negative 9/29 @ [facility]  Pharmacy: [facility] [place]    76yo Male, former smoker, with FHx of CHF and PMHx of HTN, HLD, CAD s/p PCI (CONNIE RPDA 3/2010), known RBBB with LAFB, Cardiomyopathy (normal EF per stress) and Thoracic Aortic Aneurysm who presented to his cardiologist Dr. Paolo Alcala c/o CP while at the dentist earlier in the month. Pt states that he was seeing his dentist for a check up after a recent root canal and felt left sided pressure that lasted 30 seconds and resolved on its own. He had associated dizziness and diaphoresis and denies any medications or interventions done while at the dentist at that time. He denies any palpitations, syncope, fatigue, LE edema, SOB at rest, LYNNE, orthopnea, PND, N/V/D, abd pain, cough, congestion, fever, chills or recent sick contact. He underwent a CCTA (9/9/20) revealing mod stenosis of mRCA, moderate stenosis proximal to patent RPDA stent, mild disease of pLAD, remaining vessels non obstructive. Pt also had a NST 8/17/20 revealing normal stress echo with normal EF.    In light of pts risk factors, CCS class III anginal symptoms and abnormal CCTA, pt now presents to [facility] for recommended cardiac catheterization with possible intervention if clinically indicated.

## 2020-09-28 NOTE — H&P ADULT - NSHPPOADEEPVENOUSTHROMB_GEN_A_CORE
----- Message from Idalia Daniels sent at 5/23/2018 11:23 AM CDT -----  Contact: [phone]  Patient requesting same day appointment due to diarrhea for 13 days straight, gas and unable to eat.   Please call patient at [phone].   Thanks!    
Pt offered appt tomorrow, refused.  Instr can see PCP, states will try that.  
no

## 2020-09-28 NOTE — H&P ADULT - NSICDXPASTMEDICALHX_GEN_ALL_CORE_FT
PAST MEDICAL HISTORY:  CAD (coronary artery disease)     HLD (hyperlipidemia)     HTN (hypertension)     Nephrolithiasis

## 2020-09-28 NOTE — H&P ADULT - NSICDXPASTSURGICALHX_GEN_ALL_CORE_FT
PAST SURGICAL HISTORY:  H/O cardiac catheterization     History of surgery on arm     S/P rotator cuff repair

## 2020-10-01 ENCOUNTER — OUTPATIENT (OUTPATIENT)
Dept: OUTPATIENT SERVICES | Facility: HOSPITAL | Age: 75
LOS: 1 days | Discharge: NURSING, REHAB OR LONG-TERM CARE | End: 2020-10-01
Payer: COMMERCIAL

## 2020-10-01 DIAGNOSIS — Z98.890 OTHER SPECIFIED POSTPROCEDURAL STATES: Chronic | ICD-10-CM

## 2020-10-01 LAB
A1C WITH ESTIMATED AVERAGE GLUCOSE RESULT: 5.4 % — SIGNIFICANT CHANGE UP (ref 4–5.6)
ALBUMIN SERPL ELPH-MCNC: 4.1 G/DL — SIGNIFICANT CHANGE UP (ref 3.3–5)
ALP SERPL-CCNC: 88 U/L — SIGNIFICANT CHANGE UP (ref 40–120)
ALT FLD-CCNC: 40 U/L — SIGNIFICANT CHANGE UP (ref 10–45)
ANION GAP SERPL CALC-SCNC: 11 MMOL/L — SIGNIFICANT CHANGE UP (ref 5–17)
APTT BLD: 28.5 SEC — SIGNIFICANT CHANGE UP (ref 27.5–35.5)
AST SERPL-CCNC: 36 U/L — SIGNIFICANT CHANGE UP (ref 10–40)
BASOPHILS # BLD AUTO: 0.03 K/UL — SIGNIFICANT CHANGE UP (ref 0–0.2)
BASOPHILS NFR BLD AUTO: 0.5 % — SIGNIFICANT CHANGE UP (ref 0–2)
BILIRUB SERPL-MCNC: 0.6 MG/DL — SIGNIFICANT CHANGE UP (ref 0.2–1.2)
BUN SERPL-MCNC: 15 MG/DL — SIGNIFICANT CHANGE UP (ref 7–23)
CALCIUM SERPL-MCNC: 9.1 MG/DL — SIGNIFICANT CHANGE UP (ref 8.4–10.5)
CHLORIDE SERPL-SCNC: 109 MMOL/L — HIGH (ref 96–108)
CHOLEST SERPL-MCNC: 159 MG/DL — SIGNIFICANT CHANGE UP (ref 10–199)
CK MB CFR SERPL CALC: 5.3 NG/ML — SIGNIFICANT CHANGE UP (ref 0–6.7)
CK SERPL-CCNC: 122 U/L — SIGNIFICANT CHANGE UP (ref 30–200)
CO2 SERPL-SCNC: 22 MMOL/L — SIGNIFICANT CHANGE UP (ref 22–31)
CREAT SERPL-MCNC: 0.89 MG/DL — SIGNIFICANT CHANGE UP (ref 0.5–1.3)
EOSINOPHIL # BLD AUTO: 0.27 K/UL — SIGNIFICANT CHANGE UP (ref 0–0.5)
EOSINOPHIL NFR BLD AUTO: 4.1 % — SIGNIFICANT CHANGE UP (ref 0–6)
ESTIMATED AVERAGE GLUCOSE: 108 MG/DL — SIGNIFICANT CHANGE UP (ref 68–114)
GLUCOSE SERPL-MCNC: 101 MG/DL — HIGH (ref 70–99)
HCT VFR BLD CALC: 41.9 % — SIGNIFICANT CHANGE UP (ref 39–50)
HDLC SERPL-MCNC: 40 MG/DL — SIGNIFICANT CHANGE UP
HGB BLD-MCNC: 13 G/DL — SIGNIFICANT CHANGE UP (ref 13–17)
IMM GRANULOCYTES NFR BLD AUTO: 0.3 % — SIGNIFICANT CHANGE UP (ref 0–1.5)
INR BLD: 0.98 — SIGNIFICANT CHANGE UP (ref 0.88–1.16)
LIPID PNL WITH DIRECT LDL SERPL: 87 MG/DL — SIGNIFICANT CHANGE UP
LYMPHOCYTES # BLD AUTO: 1.82 K/UL — SIGNIFICANT CHANGE UP (ref 1–3.3)
LYMPHOCYTES # BLD AUTO: 27.4 % — SIGNIFICANT CHANGE UP (ref 13–44)
MCHC RBC-ENTMCNC: 23.5 PG — LOW (ref 27–34)
MCHC RBC-ENTMCNC: 31 GM/DL — LOW (ref 32–36)
MCV RBC AUTO: 75.6 FL — LOW (ref 80–100)
MONOCYTES # BLD AUTO: 0.5 K/UL — SIGNIFICANT CHANGE UP (ref 0–0.9)
MONOCYTES NFR BLD AUTO: 7.5 % — SIGNIFICANT CHANGE UP (ref 2–14)
NEUTROPHILS # BLD AUTO: 4 K/UL — SIGNIFICANT CHANGE UP (ref 1.8–7.4)
NEUTROPHILS NFR BLD AUTO: 60.2 % — SIGNIFICANT CHANGE UP (ref 43–77)
NRBC # BLD: 0 /100 WBCS — SIGNIFICANT CHANGE UP (ref 0–0)
PLATELET # BLD AUTO: 203 K/UL — SIGNIFICANT CHANGE UP (ref 150–400)
POTASSIUM SERPL-MCNC: 4.4 MMOL/L — SIGNIFICANT CHANGE UP (ref 3.5–5.3)
POTASSIUM SERPL-SCNC: 4.4 MMOL/L — SIGNIFICANT CHANGE UP (ref 3.5–5.3)
PROT SERPL-MCNC: 6.2 G/DL — SIGNIFICANT CHANGE UP (ref 6–8.3)
PROTHROM AB SERPL-ACNC: 11.8 SEC — SIGNIFICANT CHANGE UP (ref 10.6–13.6)
RBC # BLD: 5.54 M/UL — SIGNIFICANT CHANGE UP (ref 4.2–5.8)
RBC # FLD: 14.8 % — HIGH (ref 10.3–14.5)
SODIUM SERPL-SCNC: 142 MMOL/L — SIGNIFICANT CHANGE UP (ref 135–145)
TOTAL CHOLESTEROL/HDL RATIO MEASUREMENT: 4 RATIO — SIGNIFICANT CHANGE UP (ref 3.4–9.6)
TRIGL SERPL-MCNC: 161 MG/DL — HIGH (ref 10–149)
WBC # BLD: 6.64 K/UL — SIGNIFICANT CHANGE UP (ref 3.8–10.5)
WBC # FLD AUTO: 6.64 K/UL — SIGNIFICANT CHANGE UP (ref 3.8–10.5)

## 2020-10-01 PROCEDURE — 36415 COLL VENOUS BLD VENIPUNCTURE: CPT

## 2020-10-01 PROCEDURE — 93571 IV DOP VEL&/PRESS C FLO 1ST: CPT | Mod: LD

## 2020-10-01 PROCEDURE — 80061 LIPID PANEL: CPT

## 2020-10-01 PROCEDURE — 83036 HEMOGLOBIN GLYCOSYLATED A1C: CPT

## 2020-10-01 PROCEDURE — 80053 COMPREHEN METABOLIC PANEL: CPT

## 2020-10-01 PROCEDURE — C1769: CPT

## 2020-10-01 PROCEDURE — 82550 ASSAY OF CK (CPK): CPT

## 2020-10-01 PROCEDURE — 99213 OFFICE O/P EST LOW 20 MIN: CPT

## 2020-10-01 PROCEDURE — 93458 L HRT ARTERY/VENTRICLE ANGIO: CPT | Mod: 26

## 2020-10-01 PROCEDURE — 82553 CREATINE MB FRACTION: CPT

## 2020-10-01 PROCEDURE — C1894: CPT

## 2020-10-01 PROCEDURE — 93458 L HRT ARTERY/VENTRICLE ANGIO: CPT

## 2020-10-01 PROCEDURE — 93571 IV DOP VEL&/PRESS C FLO 1ST: CPT | Mod: 26,LD

## 2020-10-01 PROCEDURE — C1887: CPT

## 2020-10-01 PROCEDURE — 85610 PROTHROMBIN TIME: CPT

## 2020-10-01 PROCEDURE — 85025 COMPLETE CBC W/AUTO DIFF WBC: CPT

## 2020-10-01 PROCEDURE — 85730 THROMBOPLASTIN TIME PARTIAL: CPT

## 2020-10-01 RX ORDER — LECITHIN 1200 MG
1 CAPSULE ORAL
Qty: 0 | Refills: 0 | DISCHARGE

## 2020-10-01 RX ORDER — CHOLECALCIFEROL (VITAMIN D3) 125 MCG
1 CAPSULE ORAL
Qty: 0 | Refills: 0 | DISCHARGE

## 2020-10-01 RX ORDER — SODIUM CHLORIDE 9 MG/ML
500 INJECTION INTRAMUSCULAR; INTRAVENOUS; SUBCUTANEOUS
Refills: 0 | Status: DISCONTINUED | OUTPATIENT
Start: 2020-10-01 | End: 2020-10-01

## 2020-10-01 RX ORDER — ALLOPURINOL 300 MG
1 TABLET ORAL
Qty: 0 | Refills: 0 | DISCHARGE

## 2020-10-01 RX ORDER — UBIDECARENONE 100 MG
1 CAPSULE ORAL
Qty: 0 | Refills: 0 | DISCHARGE

## 2020-10-01 RX ORDER — LISINOPRIL 2.5 MG/1
1 TABLET ORAL
Qty: 0 | Refills: 0 | DISCHARGE

## 2020-10-01 RX ORDER — ASPIRIN/CALCIUM CARB/MAGNESIUM 324 MG
1 TABLET ORAL
Qty: 0 | Refills: 0 | DISCHARGE

## 2020-10-01 RX ORDER — CLOPIDOGREL BISULFATE 75 MG/1
600 TABLET, FILM COATED ORAL ONCE
Refills: 0 | Status: COMPLETED | OUTPATIENT
Start: 2020-10-01 | End: 2020-10-01

## 2020-10-01 RX ORDER — ASPIRIN/CALCIUM CARB/MAGNESIUM 324 MG
81 TABLET ORAL ONCE
Refills: 0 | Status: COMPLETED | OUTPATIENT
Start: 2020-10-01 | End: 2020-10-01

## 2020-10-01 RX ADMIN — CLOPIDOGREL BISULFATE 600 MILLIGRAM(S): 75 TABLET, FILM COATED ORAL at 11:56

## 2020-10-01 RX ADMIN — Medication 81 MILLIGRAM(S): at 11:56

## 2020-10-01 RX ADMIN — SODIUM CHLORIDE 75 MILLILITER(S): 9 INJECTION INTRAMUSCULAR; INTRAVENOUS; SUBCUTANEOUS at 11:56

## 2020-10-01 NOTE — CONSULT NOTE ADULT - SUBJECTIVE AND OBJECTIVE BOX
Preventive Cardiology Consultation Note    CHIEF COMPLAINT: s/p cardiac catheterization requiring cardiovascular prevention optimization and education    HISTORY OF PRESENT ILLNESS: 76yo Male, former smoker, with FHx of CHF and PMHx of HTN, HLD, CAD s/p PCI (CONNIE RPDA 3/2010), known RBBB with LAFB, Cardiomyopathy (normal EF per stress) and Thoracic Aortic Aneurysm whois now s/p diagnostic cardiac cath 10/1/2020 IFR pLAD (30-50%) =0.95 (not physiologically significant) , normal LM and D1, pLCx and pRCA 20-30%, patent stent RPDA, LVEF 50-55%, LVEDP 20 mm Hg.     Review of systems otherwise negative.     Lifestyle History:  Mediterranean Diet Score (9 question survey) was 5.   (8-9: optimal, 6-7: near-optimal, 4-5: suboptimal, 0-3: markedly suboptimal)  Exercise: Patient reports exercising at a moderate level for 30-60 minutes per week.   Smoking: Former smoker (quit in 1989).  Stress: Patient denies any stress.     PAST MEDICAL & SURGICAL HISTORY:  CAD (coronary artery disease)    HLD (hyperlipidemia)    HTN (hypertension)    Nephrolithiasis    S/P rotator cuff repair    History of surgery on arm    H/O cardiac catheterization      FAMILY HISTORY:   CVA - father    Allergies:   No Known Allergies      HOME MEDICATIONS:   allopurinol 300 mg oral tablet: 1 tab(s) orally once a day (01 Oct 2020 11:43)  aspirin 81 mg oral tablet: 1 tab(s) orally once a day (01 Oct 2020 11:43)  CoQ10 300 mg oral capsule: 1 cap(s) orally once a day (01 Oct 2020 11:43)  lecithin 520 mg oral capsule: 1 tab(s) orally once a day (01 Oct 2020 11:43)  lisinopril 10 mg oral tablet: 1 tab(s) orally once a day (01 Oct 2020 11:43)  Multiple Vitamins oral tablet: 1 tab(s) orally once a day (01 Oct 2020 11:43)  Red Yeast Rice 600 mg oral capsule: 2 cap(s) orally once a day (01 Oct 2020 11:43)  Vitamin D3 1000 intl units (25 mcg) oral tablet: 1 tab(s) orally once a day (01 Oct 2020 11:43)      PHYSICAL EXAM:  · Height (FEET)	5 Feet  · Height (INCHES)	9 Inch(s)  · Height (CENTIMETERS)	175.26 Centimeter(s)  · 	 used   · Dosing Weight (KILOGRAMS)	72.6 kg  · Dosing Weight  (POUNDS)	160 Pound(s)  · BSA (m2)	1.88 Meter Squared  · BMI (kG/m2)	23.6      T/HR/RR/BP:  · Temp (F)	98.6 Degrees F  · Temp (C)	37 Degrees C  · Heart Rate	 53 /min  · Respiration Rate (breaths/min)	16 /min  · BP Systolic	148 mm Hg  · BP Diastolic	91 mm Hg  · BP Noninvasive Mean	110 mm Hg  · SpO2 (%)	96 %  	  Gen- awake, conversive  Head-NCAT; eyes: no corneal arcus noted b/l; no xanthelasmas   Neck- no thyromegaly, no cervical LAD, no JVD, no carotid bruit b/l  Respiratory- good air entry b/l, no WRR  Cardiovascular- S1S2, RRR, no MRG appr, no LE edema b/l, distal pulses 2+ b/l  Gastrointestinal- no HSM, no masses  Neurology- moves all extremities, no focal deficits  Psych- normal affect, non-depressed mood  Skin- no lesions, no rashes, no xanthomas     LABS:	        HDL 40  LDL 87    A1c 5.4%    ASSESSMENT/RECOMMENDATIONS: 	  Patient's dietary, exercise and overall lifestyle habits were reviewed. The concept of atherosclerosis and its systemic nature was discussed with a focus on the need to get all cardiovascular risk factors to goal. At this time, I would like to make the following recommendations to optimize atherosclerotic risk factors.     RECOMMENDATIONS:   Anti-platelet Therapy: APT per interventionalist recommendation.   Lipid Therapy: Patient is currently taking red yeast rice. In general, we recommend the use of atorvastatin or rosuvastatin, as tolerated. Along with lifestyle modifications, we recommend starting the patient on either of those agents, with the goal of lowering his LDL-C by 30-50%.   Hypertension: Blood pressures during this stay were well-controlled.   Mediterranean Diet Score is 5. Some suggestions include continue incorporating 2 or more servings per day of vegetables, fruits, and whole grains. Increase intake of fish and legumes/beans to 2 or more servings per week. Aim to increase intake of healthy fats, such as olive oil and avocados, and have a handful of nuts/seeds most days. Reduce red/processed meat consumption to 2 or fewer times per week.   Exercise: Recommended gradually increasing activity to 30-45 minutes most days of the week once cleared by referring cardiologist.   Medication Adherence: Patient has no issues with adherence at this time.   Smoking: This patient is a non-smoker.   Obesity/Overweight: The patient is at a normal weight currently, and BMI is WNL at 23.6.  Glucometabolic State: Patient's blood sugar is well-controlled at this time. HbA1c today was 5.4%.   Sleep Apnea: The patient is at low risk for sleep apnea.   Psychological Stress: The patient appears to be coping with stressors well at this time.     Thank you for the opportunity to see this patient. Please feel free to contact Prevention if there are any questions, or if you feel that your patient would benefit from continued follow-up visits with the Program.    Joy Hernadez, [facility]-BC  Cardiovascular Prevention     Marcia Sanders MD  System Director, Cardiovascular Prevention

## 2020-10-01 NOTE — PROGRESS NOTE ADULT - SUBJECTIVE AND OBJECTIVE BOX
Interventional Cardiology PA SDA Discharge Note    Patient without complaints. Ambulated and voided without difficulties    Afebrile, VSS    Ext:    		  		Right             Radial :  No  hematoma,  No   bleeding, dressing; C/D/I      Pulses:    intact RAD to baseline     A/P:  74yo Male, former smoker, with FHx of CHF and PMHx of HTN, HLD, CAD s/p PCI (CONNIE RPDA 3/2010), known RBBB with LAFB, Cardiomyopathy (normal EF per stress) and Thoracic Aortic Aneurysm who presented for cardiac cath due to patient’s risk factors, known CAD, and CCS Angina Class III Symptoms in the setting of an abnormal CTA. Patient s/p diagnostic cardiac cath 10/1/2020 IFR pLAD (30-50%) =0.95 (not physiologically significant) , normal LM and D1, pLCx and pRCA 20-30%, patent stent RPDA, LVEF 50-55%, LVEDP 20 mm Hg. Hydralazine 10 mg IV x 1 given intraprocedure for /100s. Patient has been urinating post procedure and BP remains stable 140’s/80s. Patient to continue medical management.    1.	Stable for discharge as per attending Dr. Alcala after bed rest, pt voids, right wrist stable and 30 minutes of ambulation.  2.	Follow-up with PMD/Cardiologist Dr. Paolo Alcala  in 1-2 weeks  3.	Discharged forms signed and copies in chart

## 2020-10-02 PROBLEM — I25.10 ATHEROSCLEROTIC HEART DISEASE OF NATIVE CORONARY ARTERY WITHOUT ANGINA PECTORIS: Chronic | Status: ACTIVE | Noted: 2020-09-28

## 2020-10-02 PROBLEM — I10 ESSENTIAL (PRIMARY) HYPERTENSION: Chronic | Status: ACTIVE | Noted: 2020-09-28

## 2020-10-02 PROBLEM — N20.0 CALCULUS OF KIDNEY: Chronic | Status: ACTIVE | Noted: 2020-09-28

## 2020-10-02 PROBLEM — E78.5 HYPERLIPIDEMIA, UNSPECIFIED: Chronic | Status: ACTIVE | Noted: 2020-09-28

## 2020-10-22 ENCOUNTER — APPOINTMENT (OUTPATIENT)
Dept: HEART AND VASCULAR | Facility: CLINIC | Age: 75
End: 2020-10-22
Payer: COMMERCIAL

## 2020-10-22 VITALS
OXYGEN SATURATION: 99 % | BODY MASS INDEX: 25.01 KG/M2 | HEART RATE: 48 BPM | SYSTOLIC BLOOD PRESSURE: 120 MMHG | DIASTOLIC BLOOD PRESSURE: 84 MMHG | WEIGHT: 165 LBS | TEMPERATURE: 97.7 F | HEIGHT: 68 IN | RESPIRATION RATE: 16 BRPM

## 2020-10-22 PROCEDURE — 99215 OFFICE O/P EST HI 40 MIN: CPT

## 2020-10-22 RX ORDER — ISOSORBIDE MONONITRATE 10 MG/1
10 TABLET ORAL
Qty: 7 | Refills: 0 | Status: DISCONTINUED | COMMUNITY
Start: 2020-09-16 | End: 2020-10-22

## 2020-10-22 RX ORDER — METOPROLOL SUCCINATE 25 MG/1
25 TABLET, EXTENDED RELEASE ORAL
Qty: 7 | Refills: 0 | Status: DISCONTINUED | COMMUNITY
Start: 2020-09-16 | End: 2020-10-22

## 2020-10-22 NOTE — REASON FOR VISIT
[Follow-Up - Clinic] : a clinic follow-up of [Abnormal ECG] : an abnormal ECG [Cardiomyopathy] : cardiomyopathy [Coronary Artery Disease] : coronary artery disease [Hyperlipidemia] : hyperlipidemia [Hypertension] : hypertension [Peripheral Vascular Disease] : peripheral vascular disease [FreeTextEntry1] : AI/TR

## 2020-10-22 NOTE — HISTORY OF PRESENT ILLNESS
[FreeTextEntry1] : James STUBBS [facility] returns for follow up.  \par \par Clinical hx from 8/13/2020 visit:\par He developed chest pressure, diaphoresis, nausea, and lightheadedness while getting dental work yesterday.  He was unsteady for some time.  He went home and rested for the remainder of the day.  \par \par Today, he denies cp, sob, pnd, orthopnea, edema, palp, or loc.\par \par He is active (plays golf frequently) and remains compliant with meds.\par \par EXSE 8/2020: nl lv sys fxn; indeterminant caba fxn; Ao root (4.6 cm); mild to mod AI; mild TR; 11:15 min Blayne; no ischemia; high grade ventricular ectopy in recovery\par CTA 9/2020: mild LAD/CX disease; moderate RCA disease; patent RPDA stent; asc aorta 4.6 cm and arch 4.2 cm\par Cardiac Cath 10/2020: 30-50% LAD bhargavi 0.95 iFR; 20-30% RCA and CX; patent RPDA stent\par \par Recovery has been unremarkable.\par \par Reviewed clinical hx and results in detail.\par \par Recommendations:\par 1. continue CV meds\par 2. consider ZIO\par 3. collect records for patient\par 4. exercise/diet counseling provided\par 5. f/u 4 months

## 2020-10-22 NOTE — PHYSICAL EXAM
[General Appearance - Well Developed] : well developed [Normal Appearance] : normal appearance [Well Groomed] : well groomed [General Appearance - Well Nourished] : well nourished [No Deformities] : no deformities [General Appearance - In No Acute Distress] : no acute distress [Normal Conjunctiva] : the conjunctiva exhibited no abnormalities [Eyelids - No Xanthelasma] : the eyelids demonstrated no xanthelasmas [Normal Oral Mucosa] : normal oral mucosa [No Oral Pallor] : no oral pallor [No Oral Cyanosis] : no oral cyanosis [Normal Jugular Venous A Waves Present] : normal jugular venous A waves present [Normal Jugular Venous V Waves Present] : normal jugular venous V waves present [No Jugular Venous Doran A Waves] : no jugular venous doran A waves [Respiration, Rhythm And Depth] : normal respiratory rhythm and effort [Exaggerated Use Of Accessory Muscles For Inspiration] : no accessory muscle use [Auscultation Breath Sounds / Voice Sounds] : lungs were clear to auscultation bilaterally [Heart Rate And Rhythm] : heart rate and rhythm were normal [Heart Sounds] : normal S1 and S2 [Abdomen Soft] : soft [Abdomen Tenderness] : non-tender [Abdomen Mass (___ Cm)] : no abdominal mass palpated [Abnormal Walk] : normal gait [Gait - Sufficient For Exercise Testing] : the gait was sufficient for exercise testing [Nail Clubbing] : no clubbing of the fingernails [Cyanosis, Localized] : no localized cyanosis [Petechial Hemorrhages (___cm)] : no petechial hemorrhages [Skin Color & Pigmentation] : normal skin color and pigmentation [] : no rash [No Venous Stasis] : no venous stasis [Skin Lesions] : no skin lesions [No Skin Ulcers] : no skin ulcer [No Xanthoma] : no  xanthoma was observed [FreeTextEntry1] : L arm in sling

## 2020-10-22 NOTE — REVIEW OF SYSTEMS
[Loss Of Hearing] : hearing loss [Negative] : Heme/Lymph [Fever] : no fever [Headache] : no headache [Chills] : no chills [Feeling Fatigued] : not feeling fatigued [Earache] : no earache [Discharge From The Ears] : no discharge from the ears [Mouth Sores] : no mouth sores [Sore Throat] : no sore throat [Sinus Pressure] : no sinus pressure

## 2020-10-22 NOTE — DISCUSSION/SUMMARY
[Bundle Branch Block] : ~T bundle branch block [Cardiomyopathy] : cardiomyopathy [Possible Cardiac Ischemia (Intermd Prob)] : possible cardiac ischemia (intermediate probability) [Non-Cardiac] : non-cardiac symptoms [Dietary Modification] : dietary modification [Hyperlipidemia] : hyperlipidemia [Diet Modification] : diet modification [Exercise] : exercise [Hypertension] : hypertension [Exercise Regimen] : an exercise regimen [Sodium Restriction] : sodium restriction [Peripheral Vascular Disease] : peripheral vascular disease [None] : none [Exercise Rehab] : exercise rehabilitation [Patient] : the patient [Coronary Artery Disease] : coronary artery disease [Stable] : stable [de-identified] : TAA 4.6 cm [FreeTextEntry1] : AI - mild to moderate\par TR - mild

## 2021-08-03 ENCOUNTER — APPOINTMENT (OUTPATIENT)
Dept: HEART AND VASCULAR | Facility: CLINIC | Age: 76
End: 2021-08-03
Payer: COMMERCIAL

## 2021-08-03 VITALS
HEART RATE: 58 BPM | RESPIRATION RATE: 16 BRPM | HEIGHT: 68 IN | WEIGHT: 154 LBS | TEMPERATURE: 97.3 F | OXYGEN SATURATION: 96 % | DIASTOLIC BLOOD PRESSURE: 78 MMHG | SYSTOLIC BLOOD PRESSURE: 146 MMHG | BODY MASS INDEX: 23.34 KG/M2

## 2021-08-03 PROCEDURE — 99215 OFFICE O/P EST HI 40 MIN: CPT

## 2021-08-03 NOTE — HISTORY OF PRESENT ILLNESS
[FreeTextEntry1] : James STUB[facility] returns for follow up.  \par \par Today, he denies cp, sob, pnd, orthopnea, edema, palp, or loc.\par \par Approximately 3 weeks ago, he fell coming out of a boat after fishing and fractured his R clavicle.  He is in a sling now.\par \par He is active but limited due to injury. He is compliant with meds.\par \par EXSE 8/2020: nl lv sys fxn; indeterminant caba fxn; Ao root (4.6 cm); mild to mod AI; mild TR; 11:15 min Blayne; no ischemia; high grade ventricular ectopy in recovery\par CTA 9/2020: mild LAD/CX disease; moderate RCA disease; patent RPDA stent; asc aorta 4.6 cm and arch 4.2 cm\par Cardiac Cath 10/2020: 30-50% LAD bhargavi 0.95 iFR; 20-30% RCA and CX; patent RPDA stent\par \par Reviewed clinical hx in detail.\par \par Recommendations:\par 1. continue CV meds\par 2. inc water intake\par 3. collect records\par 4. exercise/diet counseling provided\par 5. blood work\par 6. CTA chest - asc aorta aneurysm

## 2021-08-03 NOTE — DISCUSSION/SUMMARY
[Bundle Branch Block] : ~T bundle branch block [Cardiomyopathy] : cardiomyopathy [Sodium Restriction] : sodium restriction [Coronary Artery Disease] : coronary artery disease [Possible Cardiac Ischemia (Intermd Prob)] : possible cardiac ischemia (intermediate probability) [Non-Cardiac] : non-cardiac symptoms [Hyperlipidemia] : hyperlipidemia [Diet Modification] : diet modification [Exercise] : exercise [Hypertension] : hypertension [Exercise Regimen] : an exercise regimen [Dietary Modification] : dietary modification [Low Sodium Diet] : low sodium diet [Peripheral Vascular Disease] : peripheral vascular disease [Stable] : stable [None] : There are no changes in medication management [Exercise Rehab] : exercise rehabilitation [Patient] : the patient [de-identified] : TAA 4.6 cm [FreeTextEntry1] : AI - mild to moderate\par TR - mild

## 2021-08-03 NOTE — REASON FOR VISIT
[Arrhythmia/ECG Abnorrmalities] : arrhythmia/ECG abnormalities [Structural Heart and Valve Disease] : structural heart and valve disease [Carotid, Aortic and Peripheral Vascular Disease] : carotid, aortic and peripheral vascular disease [FreeTextEntry3] : Sophia Iraheta [Follow-Up - Clinic] : a clinic follow-up of [Abnormal ECG] : an abnormal ECG [Cardiomyopathy] : cardiomyopathy [Coronary Artery Disease] : coronary artery disease [Hyperlipidemia] : hyperlipidemia [Hypertension] : hypertension [Peripheral Vascular Disease] : peripheral vascular disease [FreeTextEntry1] : AI/TR

## 2021-08-04 LAB
ALBUMIN SERPL ELPH-MCNC: 4.4 G/DL
ALP BLD-CCNC: 138 U/L
ALT SERPL-CCNC: 54 U/L
ANION GAP SERPL CALC-SCNC: 11 MMOL/L
AST SERPL-CCNC: 49 U/L
BASOPHILS # BLD AUTO: 0.03 K/UL
BASOPHILS NFR BLD AUTO: 0.4 %
BILIRUB SERPL-MCNC: 0.7 MG/DL
BUN SERPL-MCNC: 16 MG/DL
CALCIUM SERPL-MCNC: 9.4 MG/DL
CHLORIDE SERPL-SCNC: 106 MMOL/L
CHOLEST SERPL-MCNC: 177 MG/DL
CO2 SERPL-SCNC: 24 MMOL/L
CREAT SERPL-MCNC: 0.96 MG/DL
EOSINOPHIL # BLD AUTO: 0.15 K/UL
EOSINOPHIL NFR BLD AUTO: 2.1 %
ESTIMATED AVERAGE GLUCOSE: 103 MG/DL
GLUCOSE SERPL-MCNC: 97 MG/DL
HBA1C MFR BLD HPLC: 5.2 %
HCT VFR BLD CALC: 46.5 %
HDLC SERPL-MCNC: 45 MG/DL
HGB BLD-MCNC: 14 G/DL
IMM GRANULOCYTES NFR BLD AUTO: 0.3 %
LDLC SERPL CALC-MCNC: 114 MG/DL
LYMPHOCYTES # BLD AUTO: 1.8 K/UL
LYMPHOCYTES NFR BLD AUTO: 24.9 %
MAN DIFF?: NORMAL
MCHC RBC-ENTMCNC: 23.5 PG
MCHC RBC-ENTMCNC: 30.1 GM/DL
MCV RBC AUTO: 78.2 FL
MONOCYTES # BLD AUTO: 0.47 K/UL
MONOCYTES NFR BLD AUTO: 6.5 %
NEUTROPHILS # BLD AUTO: 4.77 K/UL
NEUTROPHILS NFR BLD AUTO: 65.8 %
NONHDLC SERPL-MCNC: 133 MG/DL
PLATELET # BLD AUTO: 272 K/UL
POTASSIUM SERPL-SCNC: 4.9 MMOL/L
PROT SERPL-MCNC: 6.7 G/DL
RBC # BLD: 5.95 M/UL
RBC # FLD: 17.1 %
SODIUM SERPL-SCNC: 141 MMOL/L
TRIGL SERPL-MCNC: 93 MG/DL
TSH SERPL-ACNC: 1.7 UIU/ML
WBC # FLD AUTO: 7.24 K/UL

## 2021-08-05 ENCOUNTER — NON-APPOINTMENT (OUTPATIENT)
Age: 76
End: 2021-08-05

## 2021-08-17 ENCOUNTER — RESULT REVIEW (OUTPATIENT)
Age: 76
End: 2021-08-17

## 2021-08-18 ENCOUNTER — NON-APPOINTMENT (OUTPATIENT)
Age: 76
End: 2021-08-18

## 2022-12-29 ENCOUNTER — NON-APPOINTMENT (OUTPATIENT)
Age: 77
End: 2022-12-29

## 2022-12-29 ENCOUNTER — APPOINTMENT (OUTPATIENT)
Dept: HEART AND VASCULAR | Facility: CLINIC | Age: 77
End: 2022-12-29

## 2022-12-29 ENCOUNTER — LABORATORY RESULT (OUTPATIENT)
Age: 77
End: 2022-12-29

## 2022-12-29 VITALS
WEIGHT: 152 LBS | OXYGEN SATURATION: 98 % | HEIGHT: 68 IN | RESPIRATION RATE: 16 BRPM | HEART RATE: 52 BPM | SYSTOLIC BLOOD PRESSURE: 136 MMHG | TEMPERATURE: 97.6 F | BODY MASS INDEX: 23.04 KG/M2 | DIASTOLIC BLOOD PRESSURE: 82 MMHG

## 2022-12-29 PROCEDURE — 99215 OFFICE O/P EST HI 40 MIN: CPT

## 2022-12-29 PROCEDURE — 93000 ELECTROCARDIOGRAM COMPLETE: CPT

## 2022-12-29 RX ORDER — BACILLUS COAGULANS/INULIN 1B-250 MG
CAPSULE ORAL
Refills: 0 | Status: DISCONTINUED | COMMUNITY
End: 2022-12-29

## 2022-12-29 NOTE — REASON FOR VISIT
[Arrhythmia/ECG Abnorrmalities] : arrhythmia/ECG abnormalities [Structural Heart and Valve Disease] : structural heart and valve disease [Hyperlipidemia] : hyperlipidemia [Hypertension] : hypertension [Coronary Artery Disease] : coronary artery disease [Carotid, Aortic and Peripheral Vascular Disease] : carotid, aortic and peripheral vascular disease [FreeTextEntry3] : Sophia Iraheta

## 2022-12-29 NOTE — DISCUSSION/SUMMARY
[Bundle Branch Block] : ~T bundle branch block [Cardiomyopathy] : cardiomyopathy [Sodium Restriction] : sodium restriction [Coronary Artery Disease] : coronary artery disease [Possible Cardiac Ischemia (Intermd Prob)] : possible cardiac ischemia (intermediate probability) [Non-Cardiac] : non-cardiac symptoms [Hyperlipidemia] : hyperlipidemia [Diet Modification] : diet modification [Exercise] : exercise [Hypertension] : hypertension [Exercise Regimen] : an exercise regimen [Dietary Modification] : dietary modification [Low Sodium Diet] : low sodium diet [Peripheral Vascular Disease] : peripheral vascular disease [Stable] : stable [None] : There are no changes in medication management [Exercise Rehab] : exercise rehabilitation [Patient] : the patient [de-identified] : mod RCA lesion, patent RPDA stent, mild LAD disease by CTA 8/2021 [de-identified] : TAA 4.6 cm [FreeTextEntry1] : 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

## 2022-12-29 NOTE — HISTORY OF PRESENT ILLNESS
[FreeTextEntry1] : James STUBBS [facility] returns for follow up.  \par \par Today, he denies cp, sob, pnd, orthopnea, edema, palp, or loc.\par \par He is active. 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 sinus bradycardia, RBBB, LAFB\par \par EXSE 8/2020: nl lv sys fxn; indeterminant caba fxn; Ao root (4.6 cm); mild to mod AI; mild TR; 11:15 min Blayne; no ischemia; high grade ventricular ectopy in recovery\par CTA 9/2020: mild LAD/CX disease; moderate RCA disease; patent RPDA stent; asc aorta 4.6 cm and arch 4.2 cm\par Cardiac Cath 10/2020: 30-50% LAD bhargavi 0.95 iFR; 20-30% RCA and CX; patent RPDA stent\par CT chest 7/2021: 4.7 cm fusfiorm asc aorta dilatation\par CTA 8/2021: mild LAD disease, mod RCA disease, patent RPDA stent\par \par Reviewed clinical hx in detail.\par \par Recommendations:\par 1. continue CV meds\par 2. inc water intake\par 3. collect records\par 4. exercise/diet counseling provided\par 5. blood work\par 6. EXSE\par 7. CTA chest - asc aorta aneurysm

## 2022-12-30 LAB
ALBUMIN SERPL ELPH-MCNC: 3.9 G/DL
ALP BLD-CCNC: 117 U/L
ALT SERPL-CCNC: 51 U/L
ANION GAP SERPL CALC-SCNC: 8 MMOL/L
AST SERPL-CCNC: 51 U/L
BASOPHILS # BLD AUTO: 0.02 K/UL
BASOPHILS NFR BLD AUTO: 0.3 %
BILIRUB SERPL-MCNC: 0.4 MG/DL
BUN SERPL-MCNC: 16 MG/DL
CALCIUM SERPL-MCNC: 8.7 MG/DL
CHLORIDE SERPL-SCNC: 108 MMOL/L
CHOLEST SERPL-MCNC: 165 MG/DL
CO2 SERPL-SCNC: 27 MMOL/L
CREAT SERPL-MCNC: 1.02 MG/DL
EGFR: 77 ML/MIN/1.73M2
EOSINOPHIL # BLD AUTO: 0.16 K/UL
EOSINOPHIL NFR BLD AUTO: 2.4 %
ESTIMATED AVERAGE GLUCOSE: 103 MG/DL
FERRITIN SERPL-MCNC: 66 NG/ML
GLUCOSE SERPL-MCNC: 106 MG/DL
HBA1C MFR BLD HPLC: 5.2 %
HCT VFR BLD CALC: 41.7 %
HDLC SERPL-MCNC: 36 MG/DL
HGB BLD-MCNC: 12.7 G/DL
IMM GRANULOCYTES NFR BLD AUTO: 0.1 %
IRON SATN MFR SERPL: 33 %
IRON SERPL-MCNC: 103 UG/DL
LDLC SERPL CALC-MCNC: 99 MG/DL
LYMPHOCYTES # BLD AUTO: 1.74 K/UL
LYMPHOCYTES NFR BLD AUTO: 26 %
MAN DIFF?: NORMAL
MCHC RBC-ENTMCNC: 24 PG
MCHC RBC-ENTMCNC: 30.5 GM/DL
MCV RBC AUTO: 78.8 FL
MONOCYTES # BLD AUTO: 0.51 K/UL
MONOCYTES NFR BLD AUTO: 7.6 %
NEUTROPHILS # BLD AUTO: 4.25 K/UL
NEUTROPHILS NFR BLD AUTO: 63.6 %
NONHDLC SERPL-MCNC: 129 MG/DL
PLATELET # BLD AUTO: 264 K/UL
POTASSIUM SERPL-SCNC: 5.5 MMOL/L
PROT SERPL-MCNC: 6.1 G/DL
RBC # BLD: 5.29 M/UL
RBC # FLD: 15.1 %
SODIUM SERPL-SCNC: 143 MMOL/L
T3FREE SERPL-MCNC: 2.4 PG/ML
T3RU NFR SERPL: 0.9 TBI
T4 SERPL-MCNC: 7.2 UG/DL
TIBC SERPL-MCNC: 311 UG/DL
TRIGL SERPL-MCNC: 152 MG/DL
TSH SERPL-ACNC: 1.8 UIU/ML
UIBC SERPL-MCNC: 208 UG/DL
WBC # FLD AUTO: 6.69 K/UL

## 2023-01-03 ENCOUNTER — NON-APPOINTMENT (OUTPATIENT)
Age: 78
End: 2023-01-03

## 2023-01-05 ENCOUNTER — NON-APPOINTMENT (OUTPATIENT)
Age: 78
End: 2023-01-05

## 2023-01-18 ENCOUNTER — APPOINTMENT (OUTPATIENT)
Dept: HEART AND VASCULAR | Facility: CLINIC | Age: 78
End: 2023-01-18
Payer: COMMERCIAL

## 2023-01-18 VITALS
BODY MASS INDEX: 23.04 KG/M2 | HEIGHT: 68 IN | HEART RATE: 59 BPM | SYSTOLIC BLOOD PRESSURE: 126 MMHG | OXYGEN SATURATION: 97 % | DIASTOLIC BLOOD PRESSURE: 70 MMHG | WEIGHT: 152 LBS

## 2023-01-18 PROCEDURE — 93351 STRESS TTE COMPLETE: CPT

## 2023-01-18 PROCEDURE — 93325 DOPPLER ECHO COLOR FLOW MAPG: CPT

## 2023-01-18 PROCEDURE — 93320 DOPPLER ECHO COMPLETE: CPT

## 2023-01-26 ENCOUNTER — NON-APPOINTMENT (OUTPATIENT)
Age: 78
End: 2023-01-26

## 2023-02-09 ENCOUNTER — APPOINTMENT (OUTPATIENT)
Dept: HEART AND VASCULAR | Facility: CLINIC | Age: 78
End: 2023-02-09
Payer: COMMERCIAL

## 2023-02-09 DIAGNOSIS — Z00.00 ENCOUNTER FOR GENERAL ADULT MEDICAL EXAMINATION W/OUT ABNORMAL FINDINGS: ICD-10-CM

## 2023-02-09 PROCEDURE — XXXXX: CPT | Mod: 1L

## 2023-02-10 LAB
ANION GAP SERPL CALC-SCNC: 9 MMOL/L
BUN SERPL-MCNC: 15 MG/DL
CALCIUM SERPL-MCNC: 9 MG/DL
CHLORIDE SERPL-SCNC: 108 MMOL/L
CO2 SERPL-SCNC: 25 MMOL/L
CREAT SERPL-MCNC: 0.96 MG/DL
EGFR: 82 ML/MIN/1.73M2
GLUCOSE SERPL-MCNC: 98 MG/DL
POTASSIUM SERPL-SCNC: 5 MMOL/L
SODIUM SERPL-SCNC: 142 MMOL/L

## 2023-02-17 ENCOUNTER — RESULT REVIEW (OUTPATIENT)
Age: 78
End: 2023-02-17

## 2023-02-23 ENCOUNTER — NON-APPOINTMENT (OUTPATIENT)
Age: 78
End: 2023-02-23

## 2023-04-14 ENCOUNTER — APPOINTMENT (OUTPATIENT)
Dept: HEART AND VASCULAR | Facility: CLINIC | Age: 78
End: 2023-04-14
Payer: COMMERCIAL

## 2023-04-14 VITALS
WEIGHT: 155 LBS | BODY MASS INDEX: 23.49 KG/M2 | SYSTOLIC BLOOD PRESSURE: 150 MMHG | RESPIRATION RATE: 16 BRPM | DIASTOLIC BLOOD PRESSURE: 70 MMHG | HEIGHT: 68 IN | TEMPERATURE: 97.9 F | HEART RATE: 72 BPM

## 2023-04-14 DIAGNOSIS — I35.1 NONRHEUMATIC AORTIC (VALVE) INSUFFICIENCY: ICD-10-CM

## 2023-04-14 DIAGNOSIS — I42.9 CARDIOMYOPATHY, UNSPECIFIED: ICD-10-CM

## 2023-04-14 PROCEDURE — 99215 OFFICE O/P EST HI 40 MIN: CPT

## 2023-04-14 NOTE — HISTORY OF PRESENT ILLNESS
[FreeTextEntry1] : James STUBBS [facility] returns for follow up.  \par \par He was evaluated for L LQ pain in March 2023 - treated fo diverticulitis.  CT scan revealed severe coronary calcification and aorta atherosclerosis.\par \par Today, he denies cp, sob, pnd, orthopnea, edema, palp, or loc.\par \par He is active. He is compliant with meds.\par \par EXSE 8/2020: nl lv sys fxn; indeterminant caba fxn; Ao root (4.6 cm); mild to mod AI; mild TR; 11:15 min Blayne; no ischemia; high grade ventricular ectopy in recovery\par CTA 9/2020: mild LAD/CX disease; moderate RCA disease; patent RPDA stent; asc aorta 4.6 cm and arch 4.2 cm\par Cardiac Cath 10/2020: 30-50% LAD bhargavi 0.95 iFR; 20-30% RCA and CX; patent RPDA stent\par CT chest 7/2021: 4.7 cm fusfiorm asc aorta dilatation\par CTA 8/2021: mild LAD disease, mod RCA disease, patent RPDA stent\par EXSE 1/2023: nl lv sys fxn; indeterminant caba fxn; LVH; aortic root 4.4 cm and asc aorta 4.6 cm; mod AI; mild TR; 9:35 min Blayne; no ischemia\par CTA Chest 2/2023: asc aorta 4.7 cm - unchanged\par \par Reviewed clinical hx and results  in detail.\par \par Recommendations:\par 1. continue CV meds\par 2. inc water intake\par 3. collect records\par 4. exercise/diet counseling provided\par 5. f/u in 4 months

## 2023-04-14 NOTE — PHYSICAL EXAM
[General Appearance - Well Developed] : well developed [Normal Appearance] : normal appearance [Well Groomed] : well groomed [General Appearance - Well Nourished] : well nourished [No Deformities] : no deformities [General Appearance - In No Acute Distress] : no acute distress [Normal Conjunctiva] : the conjunctiva exhibited no abnormalities [Eyelids - No Xanthelasma] : the eyelids demonstrated no xanthelasmas [Normal Oral Mucosa] : normal oral mucosa [No Oral Pallor] : no oral pallor [No Oral Cyanosis] : no oral cyanosis [Normal Jugular Venous A Waves Present] : normal jugular venous A waves present [Normal Jugular Venous V Waves Present] : normal jugular venous V waves present [No Jugular Venous Doran A Waves] : no jugular venous doran A waves [Respiration, Rhythm And Depth] : normal respiratory rhythm and effort [Exaggerated Use Of Accessory Muscles For Inspiration] : no accessory muscle use [Auscultation Breath Sounds / Voice Sounds] : lungs were clear to auscultation bilaterally [Heart Rate And Rhythm] : heart rate and rhythm were normal [Heart Sounds] : normal S1 and S2 [Abdomen Soft] : soft [Abdomen Tenderness] : non-tender [Abdomen Mass (___ Cm)] : no abdominal mass palpated [Gait - Sufficient For Exercise Testing] : the gait was sufficient for exercise testing [Abnormal Walk] : normal gait [Nail Clubbing] : no clubbing of the fingernails [Cyanosis, Localized] : no localized cyanosis [Petechial Hemorrhages (___cm)] : no petechial hemorrhages [FreeTextEntry1] : L arm in sling [Skin Color & Pigmentation] : normal skin color and pigmentation [] : no rash [No Venous Stasis] : no venous stasis [Skin Lesions] : no skin lesions [No Skin Ulcers] : no skin ulcer [No Xanthoma] : no  xanthoma was observed

## 2023-04-14 NOTE — DISCUSSION/SUMMARY
[Bundle Branch Block] : ~T bundle branch block [Cardiomyopathy] : cardiomyopathy [Sodium Restriction] : sodium restriction [Coronary Artery Disease] : coronary artery disease [Possible Cardiac Ischemia (Intermd Prob)] : possible cardiac ischemia (intermediate probability) [Non-Cardiac] : non-cardiac symptoms [Hyperlipidemia] : hyperlipidemia [Diet Modification] : diet modification [Exercise] : exercise [Hypertension] : hypertension [Exercise Regimen] : an exercise regimen [Dietary Modification] : dietary modification [Low Sodium Diet] : low sodium diet [Peripheral Vascular Disease] : peripheral vascular disease [Stable] : stable [None] : There are no changes in medication management [Exercise Rehab] : exercise rehabilitation [Patient] : the patient [de-identified] : mod RCA lesion, patent RPDA stent, mild LAD disease by CTA 8/2021 [de-identified] : TAA 4.7 cm by CTA chest; aortic root 4.4 and asc aorta 4.6 cm by echo; aorta atherosclerosis [FreeTextEntry1] : AI -  moderate\par TR - mild

## 2023-05-02 NOTE — DISCUSSION/SUMMARY
[Bundle Branch Block] : ~T bundle branch block [Cardiomyopathy] : cardiomyopathy [Deteriorating] : deteriorating [Anginal Equivalent] : anginal equivalent [Possible Cardiac Ischemia (Intermd Prob)] : possible cardiac ischemia (intermediate probability) [Non-Cardiac] : non-cardiac symptoms [Dietary Modification] : dietary modification [Hyperlipidemia] : hyperlipidemia [Diet Modification] : diet modification [Exercise] : exercise [Hypertension] : hypertension [Exercise Regimen] : an exercise regimen [Sodium Restriction] : sodium restriction [Stable] : stable [None] : none [Peripheral Vascular Disease] : peripheral vascular disease [Exercise Rehab] : exercise rehabilitation [Patient] : the patient [de-identified] : TAA 4.6 cm [FreeTextEntry1] : AI - mild to moderate\par TR - mild Humira Counseling:  I discussed with the patient the risks of adalimumab including but not limited to myelosuppression, immunosuppression, autoimmune hepatitis, demyelinating diseases, lymphoma, and serious infections.  The patient understands that monitoring is required including a PPD at baseline and must alert us or the primary physician if symptoms of infection or other concerning signs are noted.

## 2023-07-17 ENCOUNTER — APPOINTMENT (OUTPATIENT)
Dept: HEART AND VASCULAR | Facility: CLINIC | Age: 78
End: 2023-07-17

## 2023-07-24 ENCOUNTER — TRANSCRIPTION ENCOUNTER (OUTPATIENT)
Age: 78
End: 2023-07-24

## 2023-08-24 ENCOUNTER — APPOINTMENT (OUTPATIENT)
Dept: HEART AND VASCULAR | Facility: CLINIC | Age: 78
End: 2023-08-24
Payer: COMMERCIAL

## 2023-08-24 VITALS
SYSTOLIC BLOOD PRESSURE: 130 MMHG | DIASTOLIC BLOOD PRESSURE: 80 MMHG | BODY MASS INDEX: 22.43 KG/M2 | TEMPERATURE: 97.6 F | WEIGHT: 148 LBS | HEART RATE: 47 BPM | RESPIRATION RATE: 16 BRPM | OXYGEN SATURATION: 97 % | HEIGHT: 68 IN

## 2023-08-24 DIAGNOSIS — I71.20 THORACIC AORTIC ANEURYSM, WITHOUT RUPTURE, UNSPECIFIED: ICD-10-CM

## 2023-08-24 DIAGNOSIS — I25.10 ATHEROSCLEROTIC HEART DISEASE OF NATIVE CORONARY ARTERY W/OUT ANGINA PECTORIS: ICD-10-CM

## 2023-08-24 DIAGNOSIS — I07.1 RHEUMATIC TRICUSPID INSUFFICIENCY: ICD-10-CM

## 2023-08-24 DIAGNOSIS — I73.9 PERIPHERAL VASCULAR DISEASE, UNSPECIFIED: ICD-10-CM

## 2023-08-24 DIAGNOSIS — I10 ESSENTIAL (PRIMARY) HYPERTENSION: ICD-10-CM

## 2023-08-24 DIAGNOSIS — R94.31 ABNORMAL ELECTROCARDIOGRAM [ECG] [EKG]: ICD-10-CM

## 2023-08-24 DIAGNOSIS — E78.5 HYPERLIPIDEMIA, UNSPECIFIED: ICD-10-CM

## 2023-08-24 DIAGNOSIS — I35.1 NONRHEUMATIC AORTIC (VALVE) INSUFFICIENCY: ICD-10-CM

## 2023-08-24 DIAGNOSIS — I44.4 LEFT ANTERIOR FASCICULAR BLOCK: ICD-10-CM

## 2023-08-24 PROCEDURE — 99215 OFFICE O/P EST HI 40 MIN: CPT

## 2023-08-24 RX ORDER — TAMSULOSIN HYDROCHLORIDE 0.4 MG/1
0.4 CAPSULE ORAL
Qty: 90 | Refills: 3 | Status: ACTIVE | COMMUNITY
Start: 1900-01-01 | End: 1900-01-01

## 2023-08-25 PROBLEM — R94.31 ABNORMAL ELECTROCARDIOGRAM: Status: ACTIVE | Noted: 2019-10-24

## 2023-08-25 PROBLEM — I07.1 MILD TRICUSPID INSUFFICIENCY: Status: ACTIVE | Noted: 2020-08-20

## 2023-08-25 PROBLEM — I44.4 LEFT ANTERIOR FASCICULAR BLOCK (LAFB): Status: ACTIVE | Noted: 2019-10-24

## 2023-08-25 PROBLEM — I35.1 MODERATE AORTIC INSUFFICIENCY: Status: ACTIVE | Noted: 2020-08-20

## 2023-08-25 NOTE — DISCUSSION/SUMMARY
[Bundle Branch Block] : ~T bundle branch block [Cardiomyopathy] : cardiomyopathy [Sodium Restriction] : sodium restriction [Coronary Artery Disease] : coronary artery disease [Possible Cardiac Ischemia (Intermd Prob)] : possible cardiac ischemia (intermediate probability) [Non-Cardiac] : non-cardiac symptoms [Hyperlipidemia] : hyperlipidemia [Diet Modification] : diet modification [Exercise] : exercise [Hypertension] : hypertension [Exercise Regimen] : an exercise regimen [Dietary Modification] : dietary modification [Low Sodium Diet] : low sodium diet [Peripheral Vascular Disease] : peripheral vascular disease [Stable] : stable [None] : There are no changes in medication management [Exercise Rehab] : exercise rehabilitation [Patient] : the patient [de-identified] : mod RCA lesion, patent RPDA stent, mild LAD disease by CTA 8/2021 [de-identified] : TAA 4.7 cm by CTA chest; aortic root 4.4 and asc aorta 4.6 cm by echo; aorta atherosclerosis [FreeTextEntry1] : AI -  moderate\par  TR - mild

## 2023-08-25 NOTE — HISTORY OF PRESENT ILLNESS
[FreeTextEntry1] : James STUBBS [facility] returns for follow up.   , During the pre GI procedure evaluation, he was noted to be bradycardic on telemetry.  He was asymptomatic. The procedure was cancelled.    He is seeking new primary care, generlal surgeon (hernia) and   urology (BPH).  Today, he denies cp, sob, pnd, orthopnea, edema, palp, or loc.  He is active. He is compliant with meds.  EXSE 8/2020: nl lv sys fxn; indeterminant caba fxn; Ao root (4.6 cm); mild to mod AI; mild TR; 11:15 min Blayne; no ischemia; high grade ventricular ectopy in recovery CTA 9/2020: mild LAD/CX disease; moderate RCA disease; patent RPDA stent; asc aorta 4.6 cm and arch 4.2 cm Cardiac Cath 10/2020: 30-50% LAD bhargavi 0.95 iFR; 20-30% RCA and CX; patent RPDA stent CT chest 7/2021: 4.7 cm fusfiorm asc aorta dilatation CTA 8/2021: mild LAD disease, mod RCA disease, patent RPDA stent EXSE 1/2023: nl lv sys fxn; indeterminant caba fxn; LVH; aortic root 4.4 cm and asc aorta 4.6 cm; mod AI; mild TR; 9:35 min Blayne; no ischemia CTA Chest 2/2023: asc aorta 4.7 cm - unchanged Telemetry 7/2023: sinus bradycardia, asymptomatic 2.5 sec pause; frequent AT; PVC (AIVR), APC  Reviewed clinical hx and results  in detail.  Recommendations: 1. continue CV meds 2. inc water intake 3. follow up with GI 4. exercise/diet counseling provided 5. f/u in 4 months

## 2023-08-29 ENCOUNTER — APPOINTMENT (OUTPATIENT)
Dept: SURGERY | Facility: CLINIC | Age: 78
End: 2023-08-29
Payer: COMMERCIAL

## 2023-08-29 ENCOUNTER — NON-APPOINTMENT (OUTPATIENT)
Age: 78
End: 2023-08-29

## 2023-08-29 VITALS
WEIGHT: 145.6 LBS | HEIGHT: 69 IN | TEMPERATURE: 98.1 F | OXYGEN SATURATION: 97 % | DIASTOLIC BLOOD PRESSURE: 72 MMHG | SYSTOLIC BLOOD PRESSURE: 125 MMHG | BODY MASS INDEX: 21.56 KG/M2 | HEART RATE: 50 BPM

## 2023-08-29 DIAGNOSIS — K40.90 UNILATERAL INGUINAL HERNIA, W/OUT OBSTRUCTION OR GANGRENE, NOT SPECIFIED AS RECURRENT: ICD-10-CM

## 2023-08-29 PROCEDURE — 99203 OFFICE O/P NEW LOW 30 MIN: CPT

## 2023-08-29 NOTE — ASSESSMENT
[FreeTextEntry1] : Referring physician: Dr. Paolo Alcala  Date: 8/29/2023  Reason for referral left inguinal hernia  This is a 78-year-old gentleman who presents to the office with a complaint that he has a lump in his left groin for the past several months now.  The lump causes him no sharp pain but he has bouts of discomfort especially when he plays golf or when he does walking activities.  He also notices a lump in his left groin.  He feels he has more difficulty with reducing the hernia since April of this year.  He has had no bouts of nausea vomiting no change in bowel habits.  Patient is status post open right inguinal hernia repair versus laparoscopic right inguinal hernia repair done at [place] approximately 20 years ago.  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  He is status postcardiac stents.  He currently takes a baby aspirin.  He is not on any blood thinners.  From a pulmonary standpoint he has no shortness of breath exercise intolerance asthma or COPD.  From a cardiac standpoint he states he feels great as well no shortness of breath chest pain.  He has no GI symptomatology such as nausea vomiting change in bowel habits.  His mental status he feels is unchanged he is alert oriented has no complaints of depression.  Physical examination patient examined erect and supine position.  The abdomen is soft nontender nondistended.  He has an obvious left inguinal hernia.  The hernia is between large and moderate size.  The hernia is easily reducible.  There is no obvious findings in his left scrotum or testicle.  The right scrotum and testicles within normal limits.  There is laxity noted in the right groin no obvious hernia identified.  Impression/plan left inguinal hernia, left groin discomfort, history of a sending aortic aneurysm: This is a 78-year-old gentleman who has become symptomatic from his left inguinal hernia.  He has been having more difficulty playing golf and at this point it is interfering with his life and therefore he wants to have his hernia repaired.  He and I had a long conversation regarding how this first hernia was repaired and this gentleman is unsure whether or not it was truly laparoscopic versus open and he will be scheduled for an open left inguinal hernia repair with mesh.  The indications alternatives and complication of the procedure discussed.  Questions have been answered.  Written consent obtained in the office today.  Incidentally he is noted to have a CAT scan from March of this year from [facility] the CT demonstrates a 6 fat-containing left inguinal hernia.

## 2023-10-12 ENCOUNTER — NON-APPOINTMENT (OUTPATIENT)
Age: 78
End: 2023-10-12

## 2023-10-12 ENCOUNTER — APPOINTMENT (OUTPATIENT)
Dept: HEART AND VASCULAR | Facility: CLINIC | Age: 78
End: 2023-10-12
Payer: COMMERCIAL

## 2023-10-12 DIAGNOSIS — I45.10 UNSPECIFIED RIGHT BUNDLE-BRANCH BLOCK: ICD-10-CM

## 2023-10-12 PROCEDURE — 93000 ELECTROCARDIOGRAM COMPLETE: CPT

## 2023-10-25 ENCOUNTER — RESULT REVIEW (OUTPATIENT)
Age: 78
End: 2023-10-25

## 2023-10-30 ENCOUNTER — APPOINTMENT (OUTPATIENT)
Dept: SURGERY | Facility: HOSPITAL | Age: 78
End: 2023-10-30

## 2023-10-30 ENCOUNTER — RESULT REVIEW (OUTPATIENT)
Age: 78
End: 2023-10-30

## 2023-10-30 ENCOUNTER — TRANSCRIPTION ENCOUNTER (OUTPATIENT)
Age: 78
End: 2023-10-30

## 2023-11-14 ENCOUNTER — APPOINTMENT (OUTPATIENT)
Dept: SURGERY | Facility: CLINIC | Age: 78
End: 2023-11-14
Payer: COMMERCIAL

## 2023-11-14 VITALS
TEMPERATURE: 97.8 F | DIASTOLIC BLOOD PRESSURE: 75 MMHG | HEART RATE: 53 BPM | SYSTOLIC BLOOD PRESSURE: 138 MMHG | OXYGEN SATURATION: 98 %

## 2023-11-14 PROCEDURE — 99024 POSTOP FOLLOW-UP VISIT: CPT

## 2024-08-04 ENCOUNTER — NON-APPOINTMENT (OUTPATIENT)
Age: 79
End: 2024-08-04

## 2024-08-05 ENCOUNTER — APPOINTMENT (OUTPATIENT)
Dept: HEART AND VASCULAR | Facility: CLINIC | Age: 79
End: 2024-08-05

## 2024-08-05 PROCEDURE — 99215 OFFICE O/P EST HI 40 MIN: CPT

## 2024-08-05 PROCEDURE — 93246 EXT ECG>7D<15D RECORDING: CPT

## 2024-08-05 NOTE — HISTORY OF PRESENT ILLNESS
[FreeTextEntry1] : for cardiac evaluation prio to returning to work noted increased fatigue, low HR and palps

## 2024-08-05 NOTE — DISCUSSION/SUMMARY
[FreeTextEntry1] : stable exam, chart reviewed forms filled, not to return to work till evaluation complete CAD fatigue- non obstructive by prior cath, r/o ischemai, f/u stress echo palps- bradycardia check Holter HTN stable  lipids stable

## 2024-08-05 NOTE — REASON FOR VISIT
[Symptom and Test Evaluation] : symptom and test evaluation [Hyperlipidemia] : hyperlipidemia [Hypertension] : hypertension [Coronary Artery Disease] : coronary artery disease [Spouse] : spouse

## 2024-08-06 ENCOUNTER — NON-APPOINTMENT (OUTPATIENT)
Age: 79
End: 2024-08-06

## 2024-08-06 PROBLEM — E87.5 SERUM POTASSIUM ELEVATED: Status: ACTIVE | Noted: 2024-08-06

## 2024-08-09 ENCOUNTER — NON-APPOINTMENT (OUTPATIENT)
Age: 79
End: 2024-08-09

## 2024-08-15 ENCOUNTER — APPOINTMENT (OUTPATIENT)
Dept: HEART AND VASCULAR | Facility: CLINIC | Age: 79
End: 2024-08-15
Payer: COMMERCIAL

## 2024-08-15 VITALS
SYSTOLIC BLOOD PRESSURE: 106 MMHG | DIASTOLIC BLOOD PRESSURE: 70 MMHG | RESPIRATION RATE: 16 BRPM | BODY MASS INDEX: 21.18 KG/M2 | WEIGHT: 143 LBS | HEIGHT: 69 IN | HEART RATE: 58 BPM | OXYGEN SATURATION: 98 %

## 2024-08-15 VITALS
BODY MASS INDEX: 21.18 KG/M2 | HEART RATE: 37 BPM | HEIGHT: 69 IN | DIASTOLIC BLOOD PRESSURE: 62 MMHG | SYSTOLIC BLOOD PRESSURE: 130 MMHG | WEIGHT: 143 LBS

## 2024-08-15 DIAGNOSIS — I10 ESSENTIAL (PRIMARY) HYPERTENSION: ICD-10-CM

## 2024-08-15 DIAGNOSIS — I77.89 OTHER SPECIFIED DISORDERS OF ARTERIES AND ARTERIOLES: ICD-10-CM

## 2024-08-15 DIAGNOSIS — R53.83 OTHER FATIGUE: ICD-10-CM

## 2024-08-15 DIAGNOSIS — E78.5 HYPERLIPIDEMIA, UNSPECIFIED: ICD-10-CM

## 2024-08-15 DIAGNOSIS — I25.10 ATHEROSCLEROTIC HEART DISEASE OF NATIVE CORONARY ARTERY W/OUT ANGINA PECTORIS: ICD-10-CM

## 2024-08-15 PROCEDURE — 93320 DOPPLER ECHO COMPLETE: CPT

## 2024-08-15 PROCEDURE — 93351 STRESS TTE COMPLETE: CPT

## 2024-08-15 PROCEDURE — 99214 OFFICE O/P EST MOD 30 MIN: CPT

## 2024-08-15 PROCEDURE — 93325 DOPPLER ECHO COLOR FLOW MAPG: CPT

## 2024-08-15 NOTE — DISCUSSION/SUMMARY
[FreeTextEntry1] : stable exam CAD/fatigue- stable, normal stress echo results and labs discussed TAA stable by eco HTN stable Lipids stable Holter pending

## 2024-08-22 ENCOUNTER — APPOINTMENT (OUTPATIENT)
Dept: HEART AND VASCULAR | Facility: CLINIC | Age: 79
End: 2024-08-22
Payer: COMMERCIAL

## 2024-08-22 VITALS
WEIGHT: 144 LBS | RESPIRATION RATE: 16 BRPM | HEART RATE: 46 BPM | DIASTOLIC BLOOD PRESSURE: 62 MMHG | BODY MASS INDEX: 21.33 KG/M2 | SYSTOLIC BLOOD PRESSURE: 120 MMHG | OXYGEN SATURATION: 98 % | HEIGHT: 69 IN | TEMPERATURE: 97.6 F

## 2024-08-22 DIAGNOSIS — I47.10 SUPRAVENTRICULAR TACHYCARDIA, UNSPECIFIED: ICD-10-CM

## 2024-08-22 DIAGNOSIS — I47.29 OTHER VENTRICULAR TACHYCARDIA: ICD-10-CM

## 2024-08-22 PROCEDURE — 99214 OFFICE O/P EST MOD 30 MIN: CPT

## 2024-08-22 NOTE — DISCUSSION/SUMMARY
[FreeTextEntry1] : stable exam Holter with SVT and episode of NSVT, continued symptoms, recommend EPS evaluation

## 2024-09-13 ENCOUNTER — APPOINTMENT (OUTPATIENT)
Dept: HEART AND VASCULAR | Facility: CLINIC | Age: 79
End: 2024-09-13
Payer: COMMERCIAL

## 2024-09-13 VITALS
HEIGHT: 69 IN | BODY MASS INDEX: 22.07 KG/M2 | HEART RATE: 50 BPM | SYSTOLIC BLOOD PRESSURE: 130 MMHG | RESPIRATION RATE: 16 BRPM | OXYGEN SATURATION: 97 % | WEIGHT: 149 LBS | TEMPERATURE: 97.6 F | DIASTOLIC BLOOD PRESSURE: 70 MMHG

## 2024-09-13 DIAGNOSIS — R53.83 OTHER FATIGUE: ICD-10-CM

## 2024-09-13 DIAGNOSIS — I10 ESSENTIAL (PRIMARY) HYPERTENSION: ICD-10-CM

## 2024-09-13 DIAGNOSIS — I49.5 SICK SINUS SYNDROME: ICD-10-CM

## 2024-09-13 PROCEDURE — 99214 OFFICE O/P EST MOD 30 MIN: CPT

## 2024-09-13 RX ORDER — GABAPENTIN 100 MG/1
CAPSULE ORAL
Refills: 0 | Status: ACTIVE | COMMUNITY

## 2024-09-13 NOTE — DISCUSSION/SUMMARY
[FreeTextEntry1] : stable exaam form for work filled, to return post PPM SN dysfunction- for PPM, to improve symptoms HTN stable

## 2024-10-16 ENCOUNTER — NON-APPOINTMENT (OUTPATIENT)
Age: 79
End: 2024-10-16

## 2024-10-18 ENCOUNTER — APPOINTMENT (OUTPATIENT)
Dept: HEART AND VASCULAR | Facility: CLINIC | Age: 79
End: 2024-10-18
Payer: COMMERCIAL

## 2024-10-18 VITALS
OXYGEN SATURATION: 98 % | WEIGHT: 142 LBS | SYSTOLIC BLOOD PRESSURE: 134 MMHG | BODY MASS INDEX: 21.03 KG/M2 | HEIGHT: 69 IN | RESPIRATION RATE: 16 BRPM | TEMPERATURE: 97.9 F | DIASTOLIC BLOOD PRESSURE: 74 MMHG | HEART RATE: 74 BPM

## 2024-10-18 DIAGNOSIS — E78.5 HYPERLIPIDEMIA, UNSPECIFIED: ICD-10-CM

## 2024-10-18 DIAGNOSIS — I71.20 THORACIC AORTIC ANEURYSM, WITHOUT RUPTURE, UNSPECIFIED: ICD-10-CM

## 2024-10-18 DIAGNOSIS — I10 ESSENTIAL (PRIMARY) HYPERTENSION: ICD-10-CM

## 2024-10-18 DIAGNOSIS — I25.10 ATHEROSCLEROTIC HEART DISEASE OF NATIVE CORONARY ARTERY W/OUT ANGINA PECTORIS: ICD-10-CM

## 2024-10-18 DIAGNOSIS — I49.5 SICK SINUS SYNDROME: ICD-10-CM

## 2024-10-18 PROCEDURE — 99214 OFFICE O/P EST MOD 30 MIN: CPT
